# Patient Record
Sex: MALE | Race: WHITE
[De-identification: names, ages, dates, MRNs, and addresses within clinical notes are randomized per-mention and may not be internally consistent; named-entity substitution may affect disease eponyms.]

---

## 2017-07-27 ENCOUNTER — HOSPITAL ENCOUNTER (OUTPATIENT)
Dept: HOSPITAL 84 - D.CT | Age: 72
Discharge: HOME | End: 2017-07-27
Attending: FAMILY MEDICINE
Payer: MEDICARE

## 2017-07-27 DIAGNOSIS — J32.9: Primary | ICD-10-CM

## 2017-09-08 ENCOUNTER — HOSPITAL ENCOUNTER (OUTPATIENT)
Dept: HOSPITAL 84 - D.OPS | Age: 72
Discharge: HOME | End: 2017-09-08
Attending: OTOLARYNGOLOGY
Payer: MEDICARE

## 2017-09-08 VITALS — DIASTOLIC BLOOD PRESSURE: 75 MMHG | SYSTOLIC BLOOD PRESSURE: 135 MMHG

## 2017-09-08 VITALS
BODY MASS INDEX: 30.87 KG/M2 | HEIGHT: 74 IN | WEIGHT: 240.5 LBS | WEIGHT: 240.5 LBS | HEIGHT: 74 IN | BODY MASS INDEX: 30.87 KG/M2

## 2017-09-08 DIAGNOSIS — Z01.812: ICD-10-CM

## 2017-09-08 DIAGNOSIS — J32.0: Primary | ICD-10-CM

## 2017-09-08 DIAGNOSIS — J32.8: ICD-10-CM

## 2017-09-08 LAB
ERYTHROCYTE [DISTWIDTH] IN BLOOD BY AUTOMATED COUNT: 13.5 % (ref 11.5–14.5)
HCT VFR BLD CALC: 42.1 % (ref 42–54)
HGB BLD-MCNC: 14.6 G/DL (ref 13.5–17.5)
MCH RBC QN AUTO: 34 PG (ref 26–34)
MCHC RBC AUTO-ENTMCNC: 34.7 G/DL (ref 31–37)
MCV RBC: 97.9 FL (ref 80–100)
PLATELET # BLD: 157 10X3/UL (ref 130–400)
PMV BLD AUTO: 10.4 FL (ref 7.4–10.4)
RBC # BLD AUTO: 4.3 10X6/UL (ref 4.2–6.1)
WBC # BLD AUTO: 5.7 10X3/UL (ref 4.8–10.8)

## 2017-09-12 LAB — BACTERIA ISLT: (no result)

## 2019-02-27 ENCOUNTER — HOSPITAL ENCOUNTER (OUTPATIENT)
Dept: HOSPITAL 84 - D.CATH | Age: 74
End: 2019-02-27
Attending: INTERNAL MEDICINE
Payer: MEDICARE

## 2019-02-27 VITALS — BODY MASS INDEX: 30.22 KG/M2 | WEIGHT: 235.49 LBS | HEIGHT: 74 IN

## 2019-02-27 VITALS — SYSTOLIC BLOOD PRESSURE: 154 MMHG | DIASTOLIC BLOOD PRESSURE: 77 MMHG

## 2019-02-27 DIAGNOSIS — I35.1: ICD-10-CM

## 2019-02-27 DIAGNOSIS — Z01.812: ICD-10-CM

## 2019-02-27 DIAGNOSIS — I25.119: Primary | ICD-10-CM

## 2019-02-27 LAB
ANION GAP SERPL CALC-SCNC: 16.1 MMOL/L (ref 8–16)
BASOPHILS NFR BLD AUTO: 0.7 % (ref 0–2)
BUN SERPL-MCNC: 11 MG/DL (ref 7–18)
CALCIUM SERPL-MCNC: 8.6 MG/DL (ref 8.5–10.1)
CHLORIDE SERPL-SCNC: 107 MMOL/L (ref 98–107)
CO2 SERPL-SCNC: 23.9 MMOL/L (ref 21–32)
CREAT SERPL-MCNC: 0.8 MG/DL (ref 0.6–1.3)
EOSINOPHIL NFR BLD: 4.4 % (ref 0–7)
ERYTHROCYTE [DISTWIDTH] IN BLOOD BY AUTOMATED COUNT: 13.6 % (ref 11.5–14.5)
GLUCOSE SERPL-MCNC: 99 MG/DL (ref 74–106)
HCT VFR BLD CALC: 37.4 % (ref 42–54)
HGB BLD-MCNC: 12.8 G/DL (ref 13.5–17.5)
IMM GRANULOCYTES NFR BLD: 0.2 % (ref 0–5)
LYMPHOCYTES NFR BLD AUTO: 32.1 % (ref 15–50)
MCH RBC QN AUTO: 33.6 PG (ref 26–34)
MCHC RBC AUTO-ENTMCNC: 34.2 G/DL (ref 31–37)
MCV RBC: 98.2 FL (ref 80–100)
MONOCYTES NFR BLD: 13.5 % (ref 2–11)
NEUTROPHILS NFR BLD AUTO: 49.1 % (ref 40–80)
OSMOLALITY SERPL CALC.SUM OF ELEC: 283 MOSM/KG (ref 275–300)
PLATELET # BLD: 141 10X3/UL (ref 130–400)
PMV BLD AUTO: 10.1 FL (ref 7.4–10.4)
POTASSIUM SERPL-SCNC: 4 MMOL/L (ref 3.5–5.1)
RBC # BLD AUTO: 3.81 10X6/UL (ref 4.2–6.1)
SODIUM SERPL-SCNC: 143 MMOL/L (ref 136–145)
WBC # BLD AUTO: 5.5 10X3/UL (ref 4.8–10.8)

## 2019-02-27 NOTE — NUR
PATIENT AWAKE, HEAD OF BED ELEVATED TO 30 DEGREES. RIGHT GROIN
DRESSING IS CDI, NO S/S OF BLEEDING OR HEMATOMA. NO C/O PAIN,
NUMBNESS, OR TINGLING. PATIENT TOLERATING PO FLUIDS. VSS ON ROOM AIR.

## 2019-02-27 NOTE — HEMODYNAMI
PATIENT:CANDACE DESOUZA MITZY                                  MEDICAL RECORD: [id number]
: 45                                            LOCATION:D.CAT          
ACCT# [id number]                                       ADMISSION DATE: 19
 
 
 Generatedon:201914:47
Patient name: CANDACE DESOUZA Patient #: [id number] Visit #: [id number] SSN: : 
1945
Date of study: 2019
Page: Of
Hemodynamic Procedure Report
****************************
Patient Data
Patient Demographics
Procedure consent was obtained
First Name: CANDACE            Gender: Male
Last Name: LYLY           : 1945
Middle Initial: MITZY         Age: 73 year(s)
Patient #: [id number]       Race: 
Visit #: [id number]
Accession #:
[id number]
Additional ID: [id number]
Contact details
Address: [facility] [address]   Phone: [phone]
State: [place]
City: [place]
Zip code: [address]
Past Medical History
Allergies: No known allergies
Admission
Admission Data
Admission Date: 2019   Admission Time: 11:48
Lab Results
Lab Result Date: 2019  Lab Result Time: 12:15
Biochemistry
Name         Units    Result                Min      Max
BUN          mg/dl    11       --(-*--)--   7        18
Creatinine   mg/dl    0.8      --(-*--)--   0.6      1.3
CBC
Name         Units    Result                Min      Max
Hematocrit   %        37.4     *-(----)--   42       54
Hemoglobin   g/dl     12.8     -*(----)--   13.5     17.5
Procedure
Procedure Types
Cath Procedure
Diagnostic Procedure
C
[facility] w/Coronaries
Aortic Root Angiography
Sedation Charges
Moderate Sedation up to 30 minutes
Procedure Description
Procedure Date
Procedure Date: 2019
Procedure Start Time: 14:17
Procedure End Time: 14:46
Procedure Staff
Name                            Function
 
Doroteo Rivera MD                   Performing Physician
Duglas Ayala RT                  Monitor
Tiffanie Hidalgo RT                    Scrub
Michelle Rodriguez RT               Scrub
Emeterio Cabral RN              Nurse
Procedure Data
Cath Procedure
Fluoroscopy
Diagnostic fluoroscopy      Total fluoroscopy Time: 5.3
time: 5.3 min               min
Diagnostic fluoroscopy      Total fluoroscopy dose:
dose: 1195 mGy              1195 mGy
Contrast Material
Contrast Material Type                       Amount (ml)
Isovue 300                                   151
Entry Location
Entry     Primary  Successful  Side  Size  Upsize Upsize Entry    Closure Succes
sful  Closure
Location                             (Fr)  1 (Fr) 2 (Fr) Remarks  Device        
      Remarks
Femoral                        Right 5 Fr                         Exoseal
artery
Estimated blood loss: 5 ml
Diagnostic catheters
Device Type               Used For           End Catheter
Placement
MULTIPACK JL 4.0 5Fr      Procedure
catheter
DIAGNOSTIC JL 5 5Fr       Procedure
catheter (178709G)
DIAGNOSTIC JL 6 5Fr       Procedure
catheter (538505P)
MULTIPACK 3DRC 5Fr        Procedure
catheter
MULTIPACK Pigtail 5 Fr    Procedure
catheter
Procedure Complications
No complications
Procedure Medications
Medication           Administration Route Dosage
0.9% NaCl            I.V.                 100 ml/hr
Oxygen               etCO2 Nasal cannula  2 l/min
Heparin Flush Bag    added to field       2 bags
(1000units/500ml NS)
Lidocaine 2%         added to field       20
Versed               I.V.                 1 mg
Fentanyl             I.V.                 50 mcg
Versed               I.V.                 1 mg
Fentanyl             I.V.                 50 mcg
Versed               I.V.                 2 mg
Hemodynamics
Rest
HGB: 12.8 (g/dl) Heart Rate: 61 (bpm)
Pressure Samples
Time     Site     Value (mmHg) Purpose      Heart      Use
Rate(bpm)
14:33    LV       133/0,20     Snapshot     62
14:34    AO       129/64(91)   Pullback     64
14:34    LV       133/0,17     Pullback     64
Gradients
 
Valve  Time  Site 1   Site 2     Mean    SEP/DFP    Peak To Heart  Use
(mmHg)  (sec/min)  Peak    Rate
(mmHg)  (bpm)
Aortic 14:34 LV       AO         7       21         4       64
133/0,17 129/64(91)
Calculations
Valve    P-P      Mean      Valve     Index  Valve    Source
Name              Gradient  Area             Flow
(cm2)
Aortic   4        7
4        7
Snapshots
Pre Cath      Intra         NCS           Post Cath
Vital Signs
Time     Heart  Resp   SPO2 etCO2   NIBP (mmHg) Rhythm  Pain    Sedation
Rate   (ipm)  (%)  (mmHg)                      Status  Level
(bpm)
14:01:24 64     19     95   0       149/87(96)  NSR     0 (11)  10(A)
, No
pain
14:05:43 58     16     97   0       145/83(120) NSR     0 (11)  10(A)
, No
pain
14:09:57 59     25     94   24      140/85(109) NSR     0 (11)  10(A)
, No
pain
14:14:10 62     14     94   0       140/84(103) NSR     0 (11)  10(A)
, No
pain
14:18:24 59     13     98   27      144/83(117) NSR     0 (11)  10(A)
, No
pain
14:22:41 60     12     97   8.2     140/82(108) NSR     0 (11)  9(A)
, No
pain
14:26:56 60     14     98   3       142/80(112) NSR     0 (11)  9(A)
, No
pain
14:31:12 65     14     97   27      139/80(112) NSR     0 (11)  9(A)
, No
pain
14:35:28 61     14     97   10.5    140/77(104) NSR     0 (11)  9(A)
, No
pain
14:39:45 60     15     98   21.7    136/78(107) NSR     0 (11)  9(A)
, No
pain
14:43:56 56     15     98   27.7    137/84(99)  NSR     0 (11)  10(A)
, No
pain
Medications
Time     Medication       Route   Dose  Verified Delivered Reason     Notes  Eff
ectiveness
by       by
14:07:26 0.9% NaCl        I.V.    100   Emeterio  Emeterio   Per
ml/hr Misha Cabral   physician
RN       RN
14:07:36 Oxygen           etCO2   2     Emeterio  Emeterio   for low 02
Nasal   l/min Lorigan  Lorigan   sats
cannula       RN       RN
 
14:07:47 Heparin Flush    added   2     Emeterio  Emeterio   used for
Bag              to      bags  Lorigan  Lorigan   procedure
(1000units/500ml field         RN       RN
NS)
14:07:58 Lidocaine 2%     added   20ml  Emeterio  Emeterio   for local
to      vial  Lorigan  Lorigan   anesthetic
field         RN       RN
14:08:09 Versed           I.V.    1 mg  Emeterio  Emeterio   for
Lorigan  Lorigan   sedation
RN       RN
14:08:19 Fentanyl         I.V.    50    Emeterio  Emeterio   for
mcg   Lorigan  Lorigan   sedation
RN       RN
14:14:28 Versed           I.V.    1 mg  Emeterio  Emeterio   for
Lorigan  Lorigan   sedation
RN       RN
14:14:34 Fentanyl         I.V.    50    Emeterio  Emeterio   for
mcg   Lorigan  Lorigan   sedation
RN       RN
14:17:29 Versed           I.V.    2 mg  Emeterio  Emeterio   for
Lorigan  Lorigan   sedation
RN       RN
Procedure Log
Time     Note
13:47:24 Informed consent obtained and on chart
13:48:37 Diagnostic Cath Status : Elective
13:49:05 Duglas Ramoser RT(R) sent for patient. Start room use.
13:49:07 Time tracking: Regular hours (M-F 7:00 - 5:00)
13:49:11 Plan of Care:Hemodynamics will remain stable., Cardiac
rhythm will remain stable., Comfort level will be
maintained., Respiratory function will remain
adequate., Patient/ family verbilizes understanding of
procedure., Procedure tolerated without complication.,
Recovers from procedure without complications..
13:56:19 Patient received from Pre/Post Procedure Room to CCL 2
Alert and oriented. Tansferred to table in Supine
position.
13:56:21 Warm blankets applied, and chantelle hugger turned on for
patient comfort.
13:56:21 Correct patient and procedure confirmed by team.
13:56:24 ECG and BP/O2 sat monitors applied to patient.
14:00:12 Lab Result : BUN 11 mg/dl
14:00:12 Lab Result : Creatinine 0.8 mg/dl
14:00:13 Lab Result : Hemoglobin 12.8 g/dl
14:00:13 Lab Result : Hematocrit 37.4 %
14:00:14 Lab results completed and on chart.
14:00:18 Vital chart was started
14:00:21 Baseline sample Acquired.
14:00:24 Rhythm: sinus rhythm
14:00:25 Full Disclosure recording started
14:00:33 H&P Date Dictated: 2019 Within 30 days and on
chart., H&P Addendum completed by physician on day of
procedure. (MUST COMPLETE FOR ALL OUTPATIENTS).
14:00:35 Pre-procedure instructions explained to patient.
14:00:35 Pre-op teaching completed and patient verbalized
understanding.
14:00:36 Family in waiting room.
14:00:36 Patient NPO since Midnight.
14:00:40 Patient allergic to No known allergies
14:00:41 Is the patient allergic to Iodine/contrast media? No.
 
14:00:42 Is patient on blood thinner?No
14:00:43 Patient diabetic? No.
14:01:18 Previous problem with sedation/anesthesia? No ?
14:01:20 Snore? No
14:01:21 Sleep apnea? No
14:01:21 Deviated septum? No
14:01:22 Opens mouth fully? Yes
14:01:23 Sticks out tongue? Yes
14:01:25 Airway obstruction? No ?
14:01:27 Dentures? No ?
14:03:08 Pre procedure: right dorsailis pedis pulse 2+ Normal;
easily identifiable; not easily obliterated
14:03:11 Pre procedure: left dorsailis pedis pulse 2+ Normal;
easily identifiable; not easily obliterated
14:03:12 Patient pain scale 0/10 ?.
14:03:20 IV patent on arrival in left forearm with 0.9% NaCl at
[facility].
14:03:24 Right groin area was prepped with chlora-prep and
draped in sterile fashion
14:03:25 Alarms reviewed by R. N.
14:03:25 Sharps counted by scrub and verified by R.N.
14:03:27 Use device set Femoral Dx
14:03:28 ACIST Syringe (95622) opened to sterile field.
14:03:28 Bag Decanter (2002S) opened to sterile field.
14:03:29 Medline Cath Pack ([id number]) opened to sterile field.
14:03:30 ACIST Hand Control (09707) opened to sterile field.
14:03:30 ACIST Manifold (70816) opened to sterile field.
14:03:31 Tegaderm 4 x 4 (1626W) opened to sterile field.
14:03:35 SHEATH 5FR [place] (GDF065) opened to sterile field.
14:03:36 DIAGNOSTIC WIRE .035 260cm J wire (069186) opened to
sterile field.
14:03:36 DIAGNOSTIC Multipack 5Fr catheter set ([id number]) opened
to sterile field.
14:03:48 Baseline sample Acquired.
14:03:51 Physician arrived
14:03:51 --------ALL STOP TIME OUT------
14:03:51 Final Timeout: patient, procedure, and site verified
with staff and physician. All members of the team are
in agreement.
14:03:53 Right groin site verified by team.
14:03:57 Fire Safety Assessment: A--An alcohol-based skin
anteseptic being used preoperatively., C--Open oxygen
or nitrous oxide is being used., D--An ESU, laser, or
fiber-optic light is being used.
14:04:00 Physical assessment completed. ASA score P 2 - A
patient with mild systemic disease as per Doroteo Rivera
MD.
14:04:02 Sedation plan: IV Moderate Sedation Medication:Versed,
Fentanyl
14:07:26 0.9% NaCl 100 ml/hr I.V. was administered by Emeterio Cabral RN; Per physician;
14:07:36 Oxygen 2 l/min etCO2 Nasal cannula was administered by
Emeterio Cabral RN; for low 02 sats;
14:07:47 Heparin Flush Bag (1000units/500ml NS) 2 bags added to
field was administered by Emeterio Cabral RN; used for
procedure;
14:07:58 Lidocaine 2% 20ml vial added to field was administered
by Emeterio Cabral RN; for local anesthetic;
14:08:09 Versed 1 mg I.V. was administered by Emeterio Cabral
RN; for sedation;
 
14:08:19 Fentanyl 50 mcg I.V. was administered by Emeterio Cabral RN; for sedation;
14:14:28 Versed 1 mg I.V. was administered by Emeterio Cabral
RN; for sedation;
14:14:34 Fentanyl 50 mcg I.V. was administered by Emeterio Cabral RN; for sedation;
14:17:27 Procedure started.
14:17:29 Versed 2 mg I.V. was administered by Emeterio Cabral
RN; for sedation;
14:17:30 Local anesthetic to right femoral artery with
Lidocaine 2% by Doroteo Rivera MD.**INITIAL ACCESS ONLY**
14:17:49 A 5 Fr sheath was inserted into the Right Femoral
artery
14:17:53 Zero performed for pressure channel P1
14:20:31 A MULTIPACK JL 4.0 5Fr catheter was advanced over the
wire and used for Procedure.
14:20:34 Catheter exchanged over wire.
14:21:04 A DIAGNOSTIC JL 5 5Fr catheter ([id number]) was advanced
over the wire and used for Procedure.
14:22:40 Catheter exchanged over wire.
14:22:50 A DIAGNOSTIC JL 6 5Fr catheter ([id number]) was advanced
over the wire and used for Procedure.
14:24:51 LCA angiography performed.
14:26:52 Catheter exchanged over wire.
14:27:02 A MULTIPACK 3DRC 5Fr catheter was advanced over the
wire and used for Procedure.
14:29:51 RCA angiography performed.
14:30:41 Catheter exchanged over wire.
14:30:56 A MULTIPACK Pigtail 5 Fr catheter was advanced over
the wire and used for Procedure.
14:33:53 LV gram done using TELLO
14:33:56 Injector settings: Ml/sec: 10, Volume: 20,
14:33:57 LV hemodynamics recorded.
14:34:20 EF : 45 %
14:34:41 Aortic Root visualized
14:39:06 Catheter removed.
14:39:45 EXOSEAL 5Fr () opened to sterile field.
14:39:52 Sheath removed intact; hemostasis achieved with
Exoseal to the Right Femoral artery.
14:39:54 Procedure ended.(Physican Out)
14:41:07 Fluoroscopy time 05.30 minutes.
14:41:10 Fluoroscopy dose: 1195 mGy
14:41:10 Flurop Dose total: 1195
14:41:30 Contrast amount:Isovue 300 151ml.
14:41:32 Sharps counted by scrub and verified by R.N.
14:41:33 Insertion/operative site no bleeding no hematoma.
14:41:38 Post-op/insertion site Right Femoral artery dressed
using a 4 x 4 and Tegaderm.
14:41:41 Post right femoral artery:stable, soft, clean and dry
14:41:42 Post Procedure Pulses reassessed and unchanged
14:43:49 Post-procedure physical assessment completed. ASA
score P 2 - A patient with mild systemic disease as
per Doroteo Rivera MD.
14:43:52 Post procedure rhythm: unchanged.
14:43:55 Estimated blood loss: 5 ml
14:43:57 Post procedure instruction explained to
patient.Patient verbalizes understanding.
14:44:12 Patient needs reinforcement of post procedure
teaching.
14:44:36 Procedure type changed to Cath procedure, Diagnostic
 
procedure, LHC, LHC w/Coronaries, Aortic Root
Angiography, Sedation Charges, Moderate Sedation up to
30 minutes
14:46:12 Procedure and supply charges have been captured,
reviewed, submitted and are correct.
14:46:14 Procedure Complication : No complications
14:46:15 Vital chart was stopped
14:46:15 See physician's report for complete and final results.
14:46:17 Report given to Pre/Post Procedure Room.
14:46:19 Patient transfered to Pre/Post Procedure Room with
Stretcher.
14:46:20 Procedure ended.
14:46:20 Full Disclosure recording stopped
14:46:25 End room use (Document Last)
Device Usage
Item Name   Manufacture  Quantity  Catalog    Hospital Part    Current Minimal L
ot# /
Number     Charge   Number  Stock   Stock   Serial#
Code
ACSUNG       Acist        1         [id number]      [id number]   328311  549640  20
Syringe     Medical
(42854)     Systems Inc
Bag         Microtek     1         S      910474   71247   371744  5
Decanter    Medical Inc.
()
Medline     Medline      1         [id number]  075138   53009   489019  5
Cath Pack
([id number])
ACIST Hand  Acist        1         61785      308296   236141  063502  5
Control     Medical
(31003)     Systems Inc
ACIST       Acist        1         87027      609184   625775  404236  5
Manifold    Medical
(49044)     Systems Inc
Tegaderm 4  3M           1         1626W      903717   599764  238516  5
x 4 (1626W)
SHEATH 5FR  Terumo       1         [id number]     487371   009667  292015  5
[place]
(GFN705)
DIAGNOSTIC  [place]      1         574967     475246   548488  932788  30
WIRE .035
260cm J
wire
(741141)
DIAGNOSTIC  Cardinal     1         VY5400     407868   98901   108769  30
Multipack   Health
5Fr
catheter
set
(VD3949)
MULTIPACK   Cardinal     1                                     376760  5
JL 4.0 5Fr  Health
catheter
DIAGNOSTIC  Cardinal     1         416135F    166086   982222  053026  5
JL 5 5Fr    Health
catheter
(182199N)
DIAGNOSTIC  Cardinal     1         118748T    430082   156025  816320  5
JL 6 5Fr    Health
catheter
 
(254396O)
MULTIPACK   Cardinal     1                                     168452  5
3DRC 5Fr    Health
catheter
MULTIPACK   Cardinal     1                                     985080  5
Pigtail 5   Health
Fr catheter
EXOSEAL 5Fr Cardinal     1               199454   390213  672925  10
()     Health
Signature Audit [place]
Stage           Time        Signature      Unsigned
Intra-Procedure 2019   Duglas Ayala
2:47:14 PM  RT(R)
Signatures
Monitor : Duglas Ayala RT Signature :
______________________________
Date : ______________ Time :
______________
 
 
 
 
 
 
 
 
 
 
 
 
 
 
 
 
 
 
 
 
 
 
 
 
 
 
 
 
 
 
 
 
 
 
 
 
 
[address] [address]

## 2019-02-27 NOTE — NUR
PATIENT RESTING, WAKES TO VERBAL STIMULI. VSS ON 2L NC. RIGHT GROIN
DRESSING IS CDI, NO S/S OF BLEEDING OR HEMATOMA. NO C/O PAIN,
NUMBNESS, OR TINGLING.

## 2019-02-27 NOTE — NUR
PATIENT VOIDED WITH NO DIFFICULTY. PATIENT TRANSPORTED VIA WHEELCHAIR
TO CAR WITH FAMILY DRIVING, ALL BELONGINGS SENT WITH PATIENT.

## 2019-02-27 NOTE — NUR
EDUCATION REGARDING DISCHARGE INSTRUCTIONS AND FOLLOW UP GIVEN TO
PATIENT AND SON, ALL QUESTIONS ANSWERED, PATIENT VOICES
UNDERSTANDING. VSS ON ROOM AIR. HEAD OF BED AT 90 DEGREES, RIGHT
GROIN DRESSING IS CDI, NO S/S OF BLEEDING OR HEMATOMA. IV REMOVED.

## 2019-03-04 ENCOUNTER — HOSPITAL ENCOUNTER (OUTPATIENT)
Dept: HOSPITAL 84 - D.CT | Age: 74
Discharge: HOME | End: 2019-03-04
Attending: THORACIC SURGERY (CARDIOTHORACIC VASCULAR SURGERY)
Payer: MEDICARE

## 2019-03-04 VITALS — BODY MASS INDEX: 30.2 KG/M2

## 2019-03-04 DIAGNOSIS — I71.4: Primary | ICD-10-CM

## 2019-03-05 ENCOUNTER — HOSPITAL ENCOUNTER (INPATIENT)
Dept: HOSPITAL 84 - D.SDCHOLD | Age: 74
LOS: 9 days | Discharge: HOME | DRG: 236 | End: 2019-03-14
Attending: THORACIC SURGERY (CARDIOTHORACIC VASCULAR SURGERY) | Admitting: THORACIC SURGERY (CARDIOTHORACIC VASCULAR SURGERY)
Payer: MEDICARE

## 2019-03-05 VITALS
BODY MASS INDEX: 30.01 KG/M2 | WEIGHT: 233.87 LBS | BODY MASS INDEX: 30.01 KG/M2 | BODY MASS INDEX: 30.01 KG/M2 | BODY MASS INDEX: 30.01 KG/M2 | WEIGHT: 233.87 LBS | HEIGHT: 74 IN | HEIGHT: 74 IN

## 2019-03-05 DIAGNOSIS — M06.9: ICD-10-CM

## 2019-03-05 DIAGNOSIS — R41.0: ICD-10-CM

## 2019-03-05 DIAGNOSIS — I71.2: ICD-10-CM

## 2019-03-05 DIAGNOSIS — I08.0: ICD-10-CM

## 2019-03-05 DIAGNOSIS — K59.00: ICD-10-CM

## 2019-03-05 DIAGNOSIS — I48.0: ICD-10-CM

## 2019-03-05 DIAGNOSIS — R07.9: ICD-10-CM

## 2019-03-05 DIAGNOSIS — I48.91: ICD-10-CM

## 2019-03-05 DIAGNOSIS — I25.119: Primary | ICD-10-CM

## 2019-03-05 LAB
ALBUMIN SERPL-MCNC: 3.8 G/DL (ref 3.4–5)
ALP SERPL-CCNC: 56 U/L (ref 46–116)
ALT SERPL-CCNC: 28 U/L (ref 10–68)
ANION GAP SERPL CALC-SCNC: 14.6 MMOL/L (ref 8–16)
APPEARANCE UR: CLEAR
APTT BLD: 27.2 SECONDS (ref 22.8–39.4)
BASOPHILS NFR BLD AUTO: 0.7 % (ref 0–2)
BILIRUB SERPL-MCNC: 0.61 MG/DL (ref 0.2–1.3)
BILIRUB SERPL-MCNC: NEGATIVE MG/DL
BUN SERPL-MCNC: 11 MG/DL (ref 7–18)
CALCIUM SERPL-MCNC: 8.5 MG/DL (ref 8.5–10.1)
CHLORIDE SERPL-SCNC: 105 MMOL/L (ref 98–107)
CO2 SERPL-SCNC: 25.9 MMOL/L (ref 21–32)
COLOR UR: YELLOW
CREAT SERPL-MCNC: 0.8 MG/DL (ref 0.6–1.3)
EOSINOPHIL NFR BLD: 6.9 % (ref 0–7)
ERYTHROCYTE [DISTWIDTH] IN BLOOD BY AUTOMATED COUNT: 13.8 % (ref 11.5–14.5)
EST. AVERAGE GLUCOSE BLD GHB EST-MCNC: 117 MG/DL (ref 74–154)
GLOBULIN SER-MCNC: 3.1 G/L
GLUCOSE SERPL-MCNC: 95 MG/DL (ref 74–106)
GLUCOSE SERPL-MCNC: NEGATIVE MG/DL
HCT VFR BLD CALC: 38.2 % (ref 42–54)
HGB BLD-MCNC: 13 G/DL (ref 13.5–17.5)
IMM GRANULOCYTES NFR BLD: 0.2 % (ref 0–5)
INR PPP: 1.03 (ref 0.85–1.17)
KETONES UR STRIP-MCNC: NEGATIVE MG/DL
LYMPHOCYTES NFR BLD AUTO: 33.2 % (ref 15–50)
MCH RBC QN AUTO: 33.8 PG (ref 26–34)
MCHC RBC AUTO-ENTMCNC: 34 G/DL (ref 31–37)
MCV RBC: 99.2 FL (ref 80–100)
MONOCYTES NFR BLD: 12 % (ref 2–11)
NEUTROPHILS NFR BLD AUTO: 47 % (ref 40–80)
NITRITE UR-MCNC: NEGATIVE MG/ML
OSMOLALITY SERPL CALC.SUM OF ELEC: 279 MOSM/KG (ref 275–300)
PH UR STRIP: 6 [PH] (ref 5–6)
PHOSPHATE SERPL-MCNC: 3.1 MG/DL (ref 2.5–4.9)
PLATELET # BLD: 164 10X3/UL (ref 130–400)
PMV BLD AUTO: 10.2 FL (ref 7.4–10.4)
POTASSIUM SERPL-SCNC: 4.5 MMOL/L (ref 3.5–5.1)
PROT SERPL-MCNC: 6.9 G/DL (ref 6.4–8.2)
PROT UR-MCNC: NEGATIVE MG/DL
PROTHROMBIN TIME: 13 SECONDS (ref 11.6–15)
RBC # BLD AUTO: 3.85 10X6/UL (ref 4.2–6.1)
SODIUM SERPL-SCNC: 141 MMOL/L (ref 136–145)
SP GR UR STRIP: 1.01 (ref 1–1.02)
T4 FREE SERPL-MCNC: 0.96 NG/DL (ref 0.76–1.46)
TSH SERPL-ACNC: 2.38 UIU/ML (ref 0.36–3.74)
URATE SERPL-MCNC: 4.2 MG/DL (ref 2.6–7.2)
UROBILINOGEN UR-MCNC: NORMAL MG/DL
WBC # BLD AUTO: 5.7 10X3/UL (ref 4.8–10.8)

## 2019-03-07 VITALS — SYSTOLIC BLOOD PRESSURE: 96 MMHG | DIASTOLIC BLOOD PRESSURE: 50 MMHG

## 2019-03-07 VITALS — DIASTOLIC BLOOD PRESSURE: 44 MMHG | SYSTOLIC BLOOD PRESSURE: 90 MMHG

## 2019-03-07 VITALS — SYSTOLIC BLOOD PRESSURE: 127 MMHG | DIASTOLIC BLOOD PRESSURE: 59 MMHG

## 2019-03-07 VITALS — DIASTOLIC BLOOD PRESSURE: 63 MMHG | SYSTOLIC BLOOD PRESSURE: 144 MMHG

## 2019-03-07 VITALS — SYSTOLIC BLOOD PRESSURE: 135 MMHG | DIASTOLIC BLOOD PRESSURE: 64 MMHG

## 2019-03-07 VITALS — DIASTOLIC BLOOD PRESSURE: 48 MMHG | SYSTOLIC BLOOD PRESSURE: 116 MMHG

## 2019-03-07 VITALS — SYSTOLIC BLOOD PRESSURE: 140 MMHG | DIASTOLIC BLOOD PRESSURE: 68 MMHG

## 2019-03-07 VITALS — DIASTOLIC BLOOD PRESSURE: 51 MMHG | SYSTOLIC BLOOD PRESSURE: 113 MMHG

## 2019-03-07 VITALS — DIASTOLIC BLOOD PRESSURE: 63 MMHG | SYSTOLIC BLOOD PRESSURE: 136 MMHG

## 2019-03-07 VITALS — SYSTOLIC BLOOD PRESSURE: 128 MMHG | DIASTOLIC BLOOD PRESSURE: 60 MMHG

## 2019-03-07 VITALS — DIASTOLIC BLOOD PRESSURE: 49 MMHG | SYSTOLIC BLOOD PRESSURE: 101 MMHG

## 2019-03-07 VITALS — DIASTOLIC BLOOD PRESSURE: 50 MMHG | SYSTOLIC BLOOD PRESSURE: 101 MMHG

## 2019-03-07 VITALS — DIASTOLIC BLOOD PRESSURE: 61 MMHG | SYSTOLIC BLOOD PRESSURE: 135 MMHG

## 2019-03-07 VITALS — SYSTOLIC BLOOD PRESSURE: 114 MMHG | DIASTOLIC BLOOD PRESSURE: 55 MMHG

## 2019-03-07 VITALS — DIASTOLIC BLOOD PRESSURE: 54 MMHG | SYSTOLIC BLOOD PRESSURE: 116 MMHG

## 2019-03-07 VITALS — SYSTOLIC BLOOD PRESSURE: 127 MMHG | DIASTOLIC BLOOD PRESSURE: 60 MMHG

## 2019-03-07 VITALS — SYSTOLIC BLOOD PRESSURE: 134 MMHG | DIASTOLIC BLOOD PRESSURE: 59 MMHG

## 2019-03-07 VITALS — DIASTOLIC BLOOD PRESSURE: 53 MMHG | SYSTOLIC BLOOD PRESSURE: 113 MMHG

## 2019-03-07 VITALS — DIASTOLIC BLOOD PRESSURE: 58 MMHG | SYSTOLIC BLOOD PRESSURE: 137 MMHG

## 2019-03-07 VITALS — DIASTOLIC BLOOD PRESSURE: 67 MMHG | SYSTOLIC BLOOD PRESSURE: 125 MMHG

## 2019-03-07 VITALS — SYSTOLIC BLOOD PRESSURE: 123 MMHG | DIASTOLIC BLOOD PRESSURE: 56 MMHG

## 2019-03-07 VITALS — SYSTOLIC BLOOD PRESSURE: 128 MMHG | DIASTOLIC BLOOD PRESSURE: 55 MMHG

## 2019-03-07 VITALS — SYSTOLIC BLOOD PRESSURE: 117 MMHG | DIASTOLIC BLOOD PRESSURE: 56 MMHG

## 2019-03-07 VITALS — SYSTOLIC BLOOD PRESSURE: 123 MMHG | DIASTOLIC BLOOD PRESSURE: 60 MMHG

## 2019-03-07 VITALS — SYSTOLIC BLOOD PRESSURE: 124 MMHG | DIASTOLIC BLOOD PRESSURE: 60 MMHG

## 2019-03-07 VITALS — DIASTOLIC BLOOD PRESSURE: 54 MMHG | SYSTOLIC BLOOD PRESSURE: 125 MMHG

## 2019-03-07 VITALS — DIASTOLIC BLOOD PRESSURE: 52 MMHG | SYSTOLIC BLOOD PRESSURE: 118 MMHG

## 2019-03-07 VITALS — DIASTOLIC BLOOD PRESSURE: 72 MMHG | SYSTOLIC BLOOD PRESSURE: 132 MMHG

## 2019-03-07 VITALS — SYSTOLIC BLOOD PRESSURE: 138 MMHG | DIASTOLIC BLOOD PRESSURE: 62 MMHG

## 2019-03-07 VITALS — SYSTOLIC BLOOD PRESSURE: 126 MMHG | DIASTOLIC BLOOD PRESSURE: 59 MMHG

## 2019-03-07 PROCEDURE — 021109W BYPASS CORONARY ARTERY, TWO ARTERIES FROM AORTA WITH AUTOLOGOUS VENOUS TISSUE, OPEN APPROACH: ICD-10-PCS | Performed by: THORACIC SURGERY (CARDIOTHORACIC VASCULAR SURGERY)

## 2019-03-07 PROCEDURE — 02100Z9 BYPASS CORONARY ARTERY, ONE ARTERY FROM LEFT INTERNAL MAMMARY, OPEN APPROACH: ICD-10-PCS | Performed by: THORACIC SURGERY (CARDIOTHORACIC VASCULAR SURGERY)

## 2019-03-07 PROCEDURE — 06BP4ZZ EXCISION OF RIGHT SAPHENOUS VEIN, PERCUTANEOUS ENDOSCOPIC APPROACH: ICD-10-PCS | Performed by: THORACIC SURGERY (CARDIOTHORACIC VASCULAR SURGERY)

## 2019-03-07 PROCEDURE — 5A1221Z PERFORMANCE OF CARDIAC OUTPUT, CONTINUOUS: ICD-10-PCS | Performed by: THORACIC SURGERY (CARDIOTHORACIC VASCULAR SURGERY)

## 2019-03-07 PROCEDURE — B24BZZ4 ULTRASONOGRAPHY OF HEART WITH AORTA, TRANSESOPHAGEAL: ICD-10-PCS | Performed by: THORACIC SURGERY (CARDIOTHORACIC VASCULAR SURGERY)

## 2019-03-07 NOTE — NUR
REPORT RECEIVED, SHIFT ASSESSMENT COMPLETED PER FLOW SHEET. AAOX4. PPP. X2
SUBSTERNAL CT TO 20 CM SUCTION, NO AIR LEAK. X1 SUBSTERNAL CT TO CATHI DRAIN,
COMPRESSED, BLOODY OUTPUT NOTED. FLORENTINO CATHETER TO GRAVITY SECURED. SUBSTERNAL
TPM WIRES SECURED, CONNECTED TO TPM, NOT TURNED ON. RT ARTERIAL LINE AND CVP
LINE LEVELED AND ZEROED WITH GOOD WAVEFORM. WATER PROVIDED, NO DYSPHAGIA. SEE
FLOW SHEET FOR COMPLETE ASSESSMENT. WILL CONTINUE TO MONITOR. CALL LIGHT
WITHIN REACH.

## 2019-03-07 NOTE — NUR
SCHEDULED MEDS GIVEN, WATER PROVIDED, NO DYSPHAGIA. DENIES NEEDS. COUGH WEAK,
NON-PRODUCTIVE. PULLING 500-750 ON IS. WILL CONTINUE TO MONITOR.

## 2019-03-07 NOTE — NUR
PT ARRIVED TO UNIT FROM O.R. INTUBATED. 8.5 ETT, 25 @LIP. ON PLASMALYTE AND
CAT. MIDSTERNAL DRESSING C,D,I. SUBSTERNAL DRESSING OVER CHEST TUBE X2 AND
CATHI DRAIN. TPM WIRES X2. UNIT TURNED OFF. RIGHT LEG HARVEST SITES COVERED
WITH GAUZE AND KERLEX DRESSING. PALPABLE PULSES. HELEN AND SCD TO LEFT LEG.
CRITICORE FLORENTINO IN USE. RIGHT RADIAL ART LINE. RIGHT IJ CENTRAL LINE. CORDIS
IS CAPPED. CVP MONITORING.

## 2019-03-07 NOTE — NUR
PT STARTING TO WAKE. OPENS EYES. FOLLOWS COMMANDS. NODS HEAD UP AND DOWN TO
ACKNOWLEDGE UNDERSTANDS WHAT I AM TELLING HIM. MOVES ALL LIMBS. NO DISTRESS.

## 2019-03-07 NOTE — NUR
PT GIVEN PERCOCET FOR PAIN. CANNOT GET COMFORTABLE. HAVE CHANGED POSITION
MULTIPLE TIMES. FEELS "CRAMPING" IN HIS UPPER ABDOMEN/LOWER CHEST.
TOLERATING ICE CHIPS AND WATER WITH NO DIFFICULTY. NO C/O NAUSEA.

## 2019-03-07 NOTE — NUR
MORPHINE HAS BEEN GIVEN FOR PAIN. PT GIVEN "HEART" PILLOW. EXPLAINED SPLINTING
TO PT AND SON AND ENCOURAGED PT TO COUGH.

## 2019-03-07 NOTE — NUR
SON AND BROTHER AT BEDSIDE. UPDATED TO PT PROGRESS. PLAN TO EXTUBATE SOON.
PT OPENS EYES TO SPEECH. FOLLOWS COMMANDS. RECOGNIZES FAMILY.

## 2019-03-07 NOTE — NUR
FLAQUITA PERRY AT BEDSIDE. UPDATE GIVEN. QUESTIONS ANSWERED. PATIENT CALM. WILL
CONTINUE TO MONITOR.

## 2019-03-07 NOTE — NUR
REASSESSMENT COMPLETED PER FLOW SHEET, SEE FOR DETAILS. NO ACUTE DISTRESS
NOTED. WATER WITH ICE PROVIDED PER PATIENT'S REQUEST. TOLERATING CLEAR LIQUIDS
WELL. DENIES OTHER NEEDS. WILL CONTINUE TO MONITOR. CALL LIGHT WITHIN REACH.

## 2019-03-08 VITALS — SYSTOLIC BLOOD PRESSURE: 108 MMHG | DIASTOLIC BLOOD PRESSURE: 66 MMHG

## 2019-03-08 VITALS — SYSTOLIC BLOOD PRESSURE: 131 MMHG | DIASTOLIC BLOOD PRESSURE: 78 MMHG

## 2019-03-08 VITALS — DIASTOLIC BLOOD PRESSURE: 57 MMHG | SYSTOLIC BLOOD PRESSURE: 111 MMHG

## 2019-03-08 VITALS — DIASTOLIC BLOOD PRESSURE: 48 MMHG | SYSTOLIC BLOOD PRESSURE: 110 MMHG

## 2019-03-08 VITALS — SYSTOLIC BLOOD PRESSURE: 115 MMHG | DIASTOLIC BLOOD PRESSURE: 72 MMHG

## 2019-03-08 VITALS — SYSTOLIC BLOOD PRESSURE: 112 MMHG | DIASTOLIC BLOOD PRESSURE: 50 MMHG

## 2019-03-08 VITALS — DIASTOLIC BLOOD PRESSURE: 79 MMHG | SYSTOLIC BLOOD PRESSURE: 117 MMHG

## 2019-03-08 VITALS — SYSTOLIC BLOOD PRESSURE: 114 MMHG | DIASTOLIC BLOOD PRESSURE: 80 MMHG

## 2019-03-08 VITALS — DIASTOLIC BLOOD PRESSURE: 53 MMHG | SYSTOLIC BLOOD PRESSURE: 94 MMHG

## 2019-03-08 VITALS — SYSTOLIC BLOOD PRESSURE: 104 MMHG | DIASTOLIC BLOOD PRESSURE: 48 MMHG

## 2019-03-08 VITALS — DIASTOLIC BLOOD PRESSURE: 74 MMHG | SYSTOLIC BLOOD PRESSURE: 112 MMHG

## 2019-03-08 VITALS — DIASTOLIC BLOOD PRESSURE: 72 MMHG | SYSTOLIC BLOOD PRESSURE: 112 MMHG

## 2019-03-08 VITALS — DIASTOLIC BLOOD PRESSURE: 86 MMHG | SYSTOLIC BLOOD PRESSURE: 132 MMHG

## 2019-03-08 VITALS — SYSTOLIC BLOOD PRESSURE: 106 MMHG | DIASTOLIC BLOOD PRESSURE: 66 MMHG

## 2019-03-08 VITALS — SYSTOLIC BLOOD PRESSURE: 124 MMHG | DIASTOLIC BLOOD PRESSURE: 52 MMHG

## 2019-03-08 VITALS — DIASTOLIC BLOOD PRESSURE: 69 MMHG | SYSTOLIC BLOOD PRESSURE: 107 MMHG

## 2019-03-08 VITALS — DIASTOLIC BLOOD PRESSURE: 74 MMHG | SYSTOLIC BLOOD PRESSURE: 107 MMHG

## 2019-03-08 VITALS — SYSTOLIC BLOOD PRESSURE: 117 MMHG | DIASTOLIC BLOOD PRESSURE: 71 MMHG

## 2019-03-08 VITALS — SYSTOLIC BLOOD PRESSURE: 101 MMHG | DIASTOLIC BLOOD PRESSURE: 64 MMHG

## 2019-03-08 VITALS — SYSTOLIC BLOOD PRESSURE: 101 MMHG | DIASTOLIC BLOOD PRESSURE: 69 MMHG

## 2019-03-08 VITALS — DIASTOLIC BLOOD PRESSURE: 77 MMHG | SYSTOLIC BLOOD PRESSURE: 136 MMHG

## 2019-03-08 VITALS — DIASTOLIC BLOOD PRESSURE: 65 MMHG | SYSTOLIC BLOOD PRESSURE: 99 MMHG

## 2019-03-08 VITALS — DIASTOLIC BLOOD PRESSURE: 73 MMHG | SYSTOLIC BLOOD PRESSURE: 106 MMHG

## 2019-03-08 VITALS — SYSTOLIC BLOOD PRESSURE: 113 MMHG | DIASTOLIC BLOOD PRESSURE: 63 MMHG

## 2019-03-08 VITALS — SYSTOLIC BLOOD PRESSURE: 110 MMHG | DIASTOLIC BLOOD PRESSURE: 70 MMHG

## 2019-03-08 VITALS — DIASTOLIC BLOOD PRESSURE: 67 MMHG | SYSTOLIC BLOOD PRESSURE: 133 MMHG

## 2019-03-08 VITALS — SYSTOLIC BLOOD PRESSURE: 103 MMHG | DIASTOLIC BLOOD PRESSURE: 45 MMHG

## 2019-03-08 VITALS — DIASTOLIC BLOOD PRESSURE: 68 MMHG | SYSTOLIC BLOOD PRESSURE: 106 MMHG

## 2019-03-08 VITALS — SYSTOLIC BLOOD PRESSURE: 111 MMHG | DIASTOLIC BLOOD PRESSURE: 58 MMHG

## 2019-03-08 VITALS — SYSTOLIC BLOOD PRESSURE: 113 MMHG | DIASTOLIC BLOOD PRESSURE: 67 MMHG

## 2019-03-08 VITALS — SYSTOLIC BLOOD PRESSURE: 121 MMHG | DIASTOLIC BLOOD PRESSURE: 71 MMHG

## 2019-03-08 VITALS — SYSTOLIC BLOOD PRESSURE: 106 MMHG | DIASTOLIC BLOOD PRESSURE: 71 MMHG

## 2019-03-08 VITALS — DIASTOLIC BLOOD PRESSURE: 65 MMHG | SYSTOLIC BLOOD PRESSURE: 103 MMHG

## 2019-03-08 VITALS — DIASTOLIC BLOOD PRESSURE: 61 MMHG | SYSTOLIC BLOOD PRESSURE: 97 MMHG

## 2019-03-08 VITALS — SYSTOLIC BLOOD PRESSURE: 109 MMHG | DIASTOLIC BLOOD PRESSURE: 70 MMHG

## 2019-03-08 VITALS — DIASTOLIC BLOOD PRESSURE: 57 MMHG | SYSTOLIC BLOOD PRESSURE: 138 MMHG

## 2019-03-08 VITALS — SYSTOLIC BLOOD PRESSURE: 119 MMHG | DIASTOLIC BLOOD PRESSURE: 68 MMHG

## 2019-03-08 VITALS — DIASTOLIC BLOOD PRESSURE: 70 MMHG | SYSTOLIC BLOOD PRESSURE: 112 MMHG

## 2019-03-08 VITALS — DIASTOLIC BLOOD PRESSURE: 47 MMHG | SYSTOLIC BLOOD PRESSURE: 85 MMHG

## 2019-03-08 VITALS — DIASTOLIC BLOOD PRESSURE: 55 MMHG | SYSTOLIC BLOOD PRESSURE: 130 MMHG

## 2019-03-08 LAB
ALBUMIN SERPL-MCNC: 3.1 G/DL (ref 3.4–5)
ALP SERPL-CCNC: 29 U/L (ref 46–116)
ALT SERPL-CCNC: 31 U/L (ref 10–68)
ANION GAP SERPL CALC-SCNC: 14.4 MMOL/L (ref 8–16)
BILIRUB SERPL-MCNC: 0.92 MG/DL (ref 0.2–1.3)
BUN SERPL-MCNC: 12 MG/DL (ref 7–18)
CALCIUM SERPL-MCNC: 7.3 MG/DL (ref 8.5–10.1)
CHLORIDE SERPL-SCNC: 107 MMOL/L (ref 98–107)
CO2 SERPL-SCNC: 24.7 MMOL/L (ref 21–32)
CREAT SERPL-MCNC: 0.8 MG/DL (ref 0.6–1.3)
ERYTHROCYTE [DISTWIDTH] IN BLOOD BY AUTOMATED COUNT: 14 % (ref 11.5–14.5)
GLOBULIN SER-MCNC: 2.2 G/L
GLUCOSE SERPL-MCNC: 162 MG/DL (ref 74–106)
HCT VFR BLD CALC: 34.7 % (ref 42–54)
HGB BLD-MCNC: 11.7 G/DL (ref 13.5–17.5)
MCH RBC QN AUTO: 33.1 PG (ref 26–34)
MCHC RBC AUTO-ENTMCNC: 33.7 G/DL (ref 31–37)
MCV RBC: 98 FL (ref 80–100)
OSMOLALITY SERPL CALC.SUM OF ELEC: 286 MOSM/KG (ref 275–300)
PLATELET # BLD: 122 10X3/UL (ref 130–400)
PMV BLD AUTO: 10.2 FL (ref 7.4–10.4)
POTASSIUM SERPL-SCNC: 4.1 MMOL/L (ref 3.5–5.1)
PROT SERPL-MCNC: 5.3 G/DL (ref 6.4–8.2)
RBC # BLD AUTO: 3.54 10X6/UL (ref 4.2–6.1)
SODIUM SERPL-SCNC: 142 MMOL/L (ref 136–145)
WBC # BLD AUTO: 9.9 10X3/UL (ref 4.8–10.8)

## 2019-03-08 NOTE — NUR
BEDSIDE SHIFT REPORT GIVEN BY DEPARTING RN. PT LAYING IN BED WITH EYES CLOSED.
AAOX4. PERRLA. C/O INCISIONAL PAIN. RT IJ CVL SALINE LOCKED. F/C DRAINING TO
GRAVITY. SAFETY MEASURES IN PLACE. FRESH WATER PROVIDED. CBIR. WILL CONTINUE
TO MONITOR.

## 2019-03-08 NOTE — MORECARE
CASE MANAGEMENT DISCHARGE SUMMARY
 
 
PATIENT: CANDACE DESOUZA MITZY                     UNIT: [id number]
ACCOUNT#: [id number]                       ADM DATE: 19
AGE: 73     : [birth year]  SEX: M            ROOM/BED: [facility]    
AUTHOR: RAMONA,DOC                             PHYSICIAN:                               
 
REFERRING PHYSICIAN: MARGARETTE LAU MD            
DATE OF SERVICE: 19
Discharge Plan
 
 
Patient Name: CANDACE DESOUZA
Facility: [facility]
Encounter #: [id number]
Medical Record #: [id number]
: 1945
Planned Disposition: Home
Anticipated Discharge Date: 
 
Discharge Date: 
Expected LOS: 
Initial Reviewer: [id number]
Initial Review Date: 2019
Generated: 3/8/19   7:42 pm 
Comments
 
DCP- Discharge Planning
 
Updated by [email or web address]: Olya Montero on 3/8/19   5:36 pm CT
Patient Name: CANDACE DESOUZA                                     
Admission Status: Urgent   
Accout number: [id number]                              
Admission Date: 2019   
: 1945                                                        
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   
Attending: MARGARETTE LAU                                                
Current LOS:  1   
  
Anticipated DC Date:    
Planned Disposition: Home   
Primary Insurance: MEDICARE A & B   
  
  
Discharge Planning Comments: CM met with patient  at bedside after obtaining 
verbal consent.  Patient states he plans on returning home after discharge 
with his son.  Patient states he will have family transport him home via 
private vehicle.  Patient denies any discharge needs at this time. Patient 
may need walk test if still requiring 02. CM will continue to follow and 
 
assist as needed for discharge planning / needs.  
  
  
  
  
  
  
: Olya Montero
 DCPIA - Discharge Planning Initial Assessment
 
Updated by [id number]: Olya Montero on 3/8/19   6:33 pm
*  Is the patient Alert and Oriented?
Yes
*  How many steps to enter\exit or inside your home?
 
*  PCP
[place]
*  Pharmacy
[facility]
*  Preadmission Environment
Home with Family
*  ADLs
Independent
*  Equipment
Cane
*  Other Equipment
GRAB BARS IN BATH ROOM
*  List name and contact numbers for known caregivers / representatives who 
currently or will assist patient after discharge:
MIAH DESOUZA - BROTHER - [phone]  ORLANDO DESOUZA - SON - [phone]  MIGUEL 
DWAYNE - FRIEND- [phone]
 
*  Verbal permission to speak to the caregivers and representatives has been 
obtained from the patient.
No
*  Community resources currently utilized
None
*  Additional services required to return to the preadmission environment?
No
*  Can the patient safely return to the preadmission environment?
Yes
*  Has this patient been hospitalized within the prior 30 days at any 
hospital?
No
 
 
 
 
 
 
 
Last DP export: 3/8/19   5:33 pm
 
Patient Name: CANDACE DESOUZA
Encounter #: [id number]
Page [id number]
 
 
 
 
 
Electronically Signed by GENNY GREENE on 19 at 1842
 
 
 
 
 
 
**All edits/amendments must be made on the electronic document**
 
DICTATION DATE: 19     : SANIYA  19     
RPT#: [id number]                                DC DATE:        
                                               STATUS: ADM IN  
[facility]
[address] [place] [address]
***END OF REPORT***

## 2019-03-08 NOTE — OP
PATIENT NAME:  CANDACE DESOUZA EDD                           MEDICAL RECORD: [id number]
:45                                             LOCATION:D.CVI    D.CV03
                                                         ADMISSION DATE:19
SURGEON:  MP LAU MD            
 
 
DATE OF OPERATION:  2019
 
SURGEON:  Mp Lau MD
 
ASSISTANT:  FERNANDA Goff MD and Melvin Adame
 
OPERATION PERFORMED:
1.  Coronary artery bypass graft times 3 (left internal mammary artery to LAD,
reverse saphenous vein graft from aorta to diagonal, aorta to obtuse marginal).
2.  Endoscopic saphenous vein harvest.
 
PREOPERATIVE DIAGNOSES:  Coronary artery disease, aortic insufficiency, mitral
regurgitation, ascending aortic aneurysm.
 
POSTOPERATIVE DIAGNOSES:  Coronary artery disease, aortic insufficiency, mitral
regurgitation, ascending aortic aneurysm.
 
ANESTHESIA:  General endotracheal anesthesia.
 
ESTIMATED BLOOD LOSS:  Total cardiopulmonary bypass with Cell Saver
retransfusion.
 
COMPLICATIONS:  None.
 
SPECIMENS:  None.
 
CONDITION:  Stable.
 
DISPOSITION:  CV ICU.
 
OPERATIVE FINDINGS:
1.  Transesophageal echocardiography confirmed mild 1+ aortic insufficiency, 1+
mitral regurgitation and a slightly dilated left ventricular cavity with good
global contractility.  This was the same after separation from cardiopulmonary
bypass.
2.  Ascending aorta of 4.5 cm.  The wall thickness was normal.
3.  Good quality greater saphenous vein harvested from the right leg
endoscopically.
4.  Good quality left internal mammary artery.  The LAD was deeply
intramyocardial until near the distal point where it was a 1.5-mm vessel going
distally, but a 2.0-mm vessel proximally.  There was good Doppler signal after
anastomosis and after reversal of heparin.
5.  The diagonal was a bifurcating vessel.  The larger vessel was closer to the
LAD.  It was a 1.5-mm vessel.
6.  Obtuse marginal of 2.0.
 
OPERATIVE INDICATION:  Coronary artery disease, unstable angina.
 
DESCRIPTION OF PROCEDURE:  The patient was brought to the operating suite. 
General anesthesia was obtained, the patient was prepped and draped.  Greater
saphenous vein was harvested from the right lower extremity utilizing endoscopic
technique.  Side branches were divided with electrocautery.  The vessel was
 
 
 
OPERATIVE REPORT                               [id number]    CANDACE DESOUZA EDD         
 
 
ligated proximal and distal and removed.  Side branches were clipped.  This
portion of the procedure was performed by the assistant surgeon, Dr. Goff. 
Side branches were tied and thin sites were oversewn.  Utilization of assistant
surgeon saved approximately 30 minutes of general anesthesia time, later the leg
was closed in 2 layers and wrapped with elastic wrap.
 
Medial sternotomy incision was made.  The subcutaneous tissue was divided by
electrocautery.  Sternum was divided with a saw.  The left hemisternum was
elevated.  Left pleural cavity was entered.  Left internal mammary artery and
vein was taken as a pedicle graft.
 
Sternal retractor was placed.  Pericardium was opened.  Heparin was given.  The
aorta was cannulated.  Dual stage venous cannula was inserted.  Internal mammary
was made ready for anastomosis.  The patient was placed on cardiopulmonary
bypass.  Sites for distal aspects were selected.  The patient's temperature was
allowed to drift.  Antegrade cardioplegia cannula was inserted.  Crossclamp was
placed.  Cardioplegia was given antegrade and this was repeated at 15 to 20
minute intervals including down the completed vein grafts.
 
Distal anastomoses were performed in standard technique.  Proximal anastomosis
with single cross-clamp technique.  Aortic root was de-aired.  Proximal
anastomoses tied down.  Vein grafts de-aired, flow restored single successful
proximal anastomotic site bleeding.  Proximal and distal anastomotic sites
inspected for bleeding.  Initially, the patient had bradycardia and then resumed
to a sinus rhythm and after full rewarming.  Atrial and ventricular pacing wires
were placed.  Left chest was evacuated.  The patient was fully rewarmed with
cardiopulmonary bypass and was stable.
 
The patient was decannulated.  The cannula sites were oversewn with pledgeted
Prolene sutures.  Protamine was given.  Thorough irrigation was undertaken. 
Hemostasis was assured.  All grafts lay appropriately.  Drains were placed in
the mediastinum and left pleural cavity.  Pericardial fat was loosely
reapproximated.  Left chest was inspected for bleeding.  Sternum was closed with
wires.  Fascia was closed.  Subcutaneous tissue was closed.  Skin was closed. 
Dermabond was placed.  The needle and sponge counts reported as correct.  The
patient was taken to ICU in stable condition.
 
TRANSINT:[id number] Voice Confirmation ID: [id number] DOCUMENT ID: [id number]
                                           
                                           MP LAU MD            
 
 
 
Electronically Signed by MP LAU on 19 at 1759
 
 
 
CC: RUBY SZYMANSKI M.D. and RUBY TRAN                  [id number]
DICTATION DATE: 19     :     19      ADM IN  
                                                                              
[facility]                                          
[address] [place] [address]

## 2019-03-08 NOTE — NUR
BED BATH GIVEN, FLORENTINO CARE PROVIDED. TOLERATED WELL. PULLING 500-750 ON IS.
COUGH WEAK, NON-PRODUCTIVE. WILL CONTINUE TO MONITOR.

## 2019-03-08 NOTE — NUR
REASSESSMENT COMPLETED PER FLOW SHEET, SEE FOR DETAILS. NO ACUTE DISTRESS
NOTED. WILL CONTINUE TO MONITOR.

## 2019-03-08 NOTE — MORECARE
CASE MANAGEMENT DISCHARGE SUMMARY
 
 
PATIENT: CANDACE DESOUZA MITZY                     UNIT: [id number]
ACCOUNT#: [id number]                       ADM DATE: 19
AGE: 73     : 45  SEX: M            [facility]    
AUTHOR: GENNY GREENE                             PHYSICIAN:                               
 
REFERRING PHYSICIAN: MARGARETTE LAU MD            
DATE OF SERVICE: 19
Discharge Plan
 
 
Patient Name: CANDACE DESOUZA
[facility]
Encounter #: [id number]
Medical Record #: [id number]
: 1945
Planned Disposition: Home
Anticipated Discharge Date: 
 
Discharge Date: 
Expected LOS: 
Initial Reviewer: [id number]
Initial Review Date: 2019
Generated: 3/8/19   7:33 pm 
 DCPIA - Discharge Planning Initial Assessment
 
Updated by [email or web address]: Olya Montero on 3/8/19   6:33 pm
*  Is the patient Alert and Oriented?
Yes
*  How many steps to enter\exit or inside your home?
 
*  PCP
[place]
*  Pharmacy
[facility]
*  Preadmission Environment
Home with Family
*  ADLs
Independent
*  Equipment
Cane
*  Other Equipment
GRAB BARS IN BATH ROOM
*  List name and contact numbers for known caregivers / representatives who 
currently or will assist patient after discharge:
MIAH DESOUZA - BROTHER - [phone]  ORLANDO DESOUZA - SON - [phone]  MIGUEL 
DWAYNE - FRIEND- [phone]
 
 
*  Verbal permission to speak to the caregivers and representatives has been 
obtained from the patient.
No
*  Community resources currently utilized
None
*  Additional services required to return to the preadmission environment?
No
*  Can the patient safely return to the preadmission environment?
Yes
*  Has this patient been hospitalized within the prior 30 days at any 
hospital?
No
 
 
 
 
 
 
Patient Name: CANDACE DESOUZA
Encounter #: [id number]
Page [phone]
 
 
 
 
 
Electronically Signed by GENNY GREENE on 19 at 1833
 
 
 
 
 
 
**All edits/amendments must be made on the electronic document**
 
DICTATION DATE: 19     : SANIYA  19     
RPT#: [id number]                                DC DATE:        
                                               STATUS: ADM IN  
[facility]
[address] [place] [address]
***END OF REPORT***

## 2019-03-08 NOTE — NUR
0830:  DR. LAU HERE.  CONDITION UPDATE GIVEN.  ORDERS REC'D TO DC ART LINE.
0900:  R RADIAL ART LINE DC'D.  MANUAL PRESSURE HELD X 2 MIN.  DRESSED WITH
2X2 AND SECURED WITH TAPE.

## 2019-03-08 NOTE — NUR
SUBSTERNAL DRESSING CHANGED. X2 TPM WIRES INTACT. LT CT CATHI DRAIN EMPTIED
USING STERILE TECHNIQUE, 70 MLS BLOODY OUTPUT. TOLERATED WELL. CALL LIGHT
WITHIN REACH. WILL CONTINUE TO MONITOR.

## 2019-03-08 NOTE — NUR
CALLED AND SPOKE TO DR. LAU, UPDATE GIVEN ON PATIENT STATUS. INFORMED HIM OF
LOW BP WHEN PATIENT GOT UP TO SIT UP IN CHAIR, INFORMED HIM OF INITIATION OF
NEOSYNEPHRINE DRIP. NO NEW ORDERS RECEIVED. WILL CONTINUE TO MONITOR.

## 2019-03-09 VITALS — DIASTOLIC BLOOD PRESSURE: 65 MMHG | SYSTOLIC BLOOD PRESSURE: 108 MMHG

## 2019-03-09 VITALS — SYSTOLIC BLOOD PRESSURE: 109 MMHG | DIASTOLIC BLOOD PRESSURE: 61 MMHG

## 2019-03-09 VITALS — SYSTOLIC BLOOD PRESSURE: 118 MMHG | DIASTOLIC BLOOD PRESSURE: 69 MMHG

## 2019-03-09 VITALS — SYSTOLIC BLOOD PRESSURE: 98 MMHG | DIASTOLIC BLOOD PRESSURE: 64 MMHG

## 2019-03-09 VITALS — SYSTOLIC BLOOD PRESSURE: 97 MMHG | DIASTOLIC BLOOD PRESSURE: 61 MMHG

## 2019-03-09 VITALS — SYSTOLIC BLOOD PRESSURE: 107 MMHG | DIASTOLIC BLOOD PRESSURE: 61 MMHG

## 2019-03-09 VITALS — SYSTOLIC BLOOD PRESSURE: 90 MMHG | DIASTOLIC BLOOD PRESSURE: 59 MMHG

## 2019-03-09 VITALS — DIASTOLIC BLOOD PRESSURE: 62 MMHG | SYSTOLIC BLOOD PRESSURE: 102 MMHG

## 2019-03-09 VITALS — SYSTOLIC BLOOD PRESSURE: 104 MMHG | DIASTOLIC BLOOD PRESSURE: 59 MMHG

## 2019-03-09 VITALS — SYSTOLIC BLOOD PRESSURE: 121 MMHG | DIASTOLIC BLOOD PRESSURE: 74 MMHG

## 2019-03-09 VITALS — DIASTOLIC BLOOD PRESSURE: 64 MMHG | SYSTOLIC BLOOD PRESSURE: 110 MMHG

## 2019-03-09 VITALS — SYSTOLIC BLOOD PRESSURE: 122 MMHG | DIASTOLIC BLOOD PRESSURE: 71 MMHG

## 2019-03-09 VITALS — SYSTOLIC BLOOD PRESSURE: 104 MMHG | DIASTOLIC BLOOD PRESSURE: 64 MMHG

## 2019-03-09 VITALS — DIASTOLIC BLOOD PRESSURE: 71 MMHG | SYSTOLIC BLOOD PRESSURE: 114 MMHG

## 2019-03-09 VITALS — SYSTOLIC BLOOD PRESSURE: 121 MMHG | DIASTOLIC BLOOD PRESSURE: 59 MMHG

## 2019-03-09 VITALS — SYSTOLIC BLOOD PRESSURE: 102 MMHG | DIASTOLIC BLOOD PRESSURE: 64 MMHG

## 2019-03-09 VITALS — SYSTOLIC BLOOD PRESSURE: 113 MMHG | DIASTOLIC BLOOD PRESSURE: 66 MMHG

## 2019-03-09 VITALS — DIASTOLIC BLOOD PRESSURE: 61 MMHG | SYSTOLIC BLOOD PRESSURE: 106 MMHG

## 2019-03-09 VITALS — SYSTOLIC BLOOD PRESSURE: 113 MMHG | DIASTOLIC BLOOD PRESSURE: 67 MMHG

## 2019-03-09 VITALS — DIASTOLIC BLOOD PRESSURE: 66 MMHG | SYSTOLIC BLOOD PRESSURE: 111 MMHG

## 2019-03-09 VITALS — DIASTOLIC BLOOD PRESSURE: 69 MMHG | SYSTOLIC BLOOD PRESSURE: 120 MMHG

## 2019-03-09 VITALS — SYSTOLIC BLOOD PRESSURE: 102 MMHG | DIASTOLIC BLOOD PRESSURE: 72 MMHG

## 2019-03-09 LAB
ALBUMIN SERPL-MCNC: 2.9 G/DL (ref 3.4–5)
ALP SERPL-CCNC: 38 U/L (ref 46–116)
ALT SERPL-CCNC: 29 U/L (ref 10–68)
ANION GAP SERPL CALC-SCNC: 15.1 MMOL/L (ref 8–16)
BILIRUB SERPL-MCNC: 0.99 MG/DL (ref 0.2–1.3)
BUN SERPL-MCNC: 11 MG/DL (ref 7–18)
CALCIUM SERPL-MCNC: 7.7 MG/DL (ref 8.5–10.1)
CHLORIDE SERPL-SCNC: 104 MMOL/L (ref 98–107)
CO2 SERPL-SCNC: 25.2 MMOL/L (ref 21–32)
CREAT SERPL-MCNC: 0.9 MG/DL (ref 0.6–1.3)
ERYTHROCYTE [DISTWIDTH] IN BLOOD BY AUTOMATED COUNT: 14.1 % (ref 11.5–14.5)
GLOBULIN SER-MCNC: 3.1 G/L
GLUCOSE SERPL-MCNC: 139 MG/DL (ref 74–106)
HCT VFR BLD CALC: 34.5 % (ref 42–54)
HGB BLD-MCNC: 11.6 G/DL (ref 13.5–17.5)
MAGNESIUM SERPL-MCNC: 2.1 MG/DL (ref 1.8–2.4)
MCH RBC QN AUTO: 33.1 PG (ref 26–34)
MCHC RBC AUTO-ENTMCNC: 33.6 G/DL (ref 31–37)
MCV RBC: 98.6 FL (ref 80–100)
OSMOLALITY SERPL CALC.SUM OF ELEC: 279 MOSM/KG (ref 275–300)
PLATELET # BLD: 125 10X3/UL (ref 130–400)
PMV BLD AUTO: 10.3 FL (ref 7.4–10.4)
POTASSIUM SERPL-SCNC: 4.3 MMOL/L (ref 3.5–5.1)
POTASSIUM SERPL-SCNC: 4.3 MMOL/L (ref 3.5–5.1)
PROT SERPL-MCNC: 6 G/DL (ref 6.4–8.2)
RBC # BLD AUTO: 3.5 10X6/UL (ref 4.2–6.1)
SODIUM SERPL-SCNC: 140 MMOL/L (ref 136–145)
WBC # BLD AUTO: 12.5 10X3/UL (ref 4.8–10.8)

## 2019-03-09 NOTE — NUR
UP TO BATHROOM WITH ASSISTANCE WITHOUT DIFICULTY. URINATED. THOUGHT HE NEEDED
TO DEFICATE. NO STOOL.

## 2019-03-09 NOTE — CN
PATIENT NAME:CANDACE DESOUZA EDD                               MEDICAL RECORD: [id number]
: 45                                              LOCATION:JAIMEID.CV03
ADMIT DATE: 19                                       ACCOUNT: [id number]
CONSULTING PHYSICIAN:    RUBY TRAN MD           
                                               
REFERRING PHYSICIAN:     MP LAU MD            
 
 
DATE OF CONSULTATION:  2019
 
REASON FOR CONSULTATION:  Medical management, postoperative coronary artery
bypass grafting, and rheumatoid arthritis.
 
HISTORY OF PRESENT ILLNESS:  The patient is a 73-year-old  male who is
noted to be having increasing exertional chest tightness over the last several
weeks.  He was referred to Dr. Rivera for this reason.  An abnormal
echocardiogram showing dilated aortic root.  He underwent cardiac
catheterization on 2019, revealing proximal LAD 90% stenosis with first
diagonal 90% stenosis in the midportion.  Circumflex had a 90% stenosis.  His LV
was upper limits of normal, ejection fraction 45% to 50%.  Ascending dilated
aortic root, almost 5 cm in diameter with mild aortic insufficiency.  He
underwent coronary artery bypass graft today by Dr. Mp Lau and Thai Goff.  He is postop now and alert.
 
PAST MEDICAL HISTORY:  Rheumatoid arthritis, osteoarthritis, amblyopia, history
of Baker's cyst, left knee, depression, diverticulitis, GERD, IBS, chronic
sinusitis, obesity, BPH, and depression.
 
PAST SURGICAL HISTORY:  Recent CABG, history of nasal sinus surgery on the left
with middle meatal antrostomy, tonsillectomy, cataract extractions OU, and left
total knee replacement, recent CABG and left middle meatal antrostomy .
 
SOCIAL HISTORY:  He is single, has 1 son who lives in town.  He is semi-retired
/owner.  Smoked for 15 years, not in some time.  Drinks
occasional alcohol, drinks at night.
 
HOME MEDICATIONS:  Lexapro 10 mg a day, Norco 5/325 one q.6 hours p.r.n. pain,
Ambien 10 mg at bedtime for sleep, Orencia 125 mg per mL injected weekly,
MiraLax 17 grams p.o. daily, vitamin B12 1 cc IM every 3 weeks, Plaquenil 200 mg
p.o. b.i.d., and fenoprofen 400 mg p.o. 3 capsules daily.
 
ALLERGIES:  None known.
 
REVIEW OF SYSTEMS:
GENERAL:  He has been quite fatigued recently.  No recent fever.
HEENT:  No recent visual change, sinus congestion, sore throat or visual
disturbance.
CARDIAC:  Exertional chest pain walking up a hill.  Pain does not radiate.  No
claudication.
RESPIRATORY:  No shortness of breath or cough.
GASTROINTESTINAL:  No nausea, vomiting, change in bowel habits.
GENITOURINARY:  Nocturia once nightly.
ENDOCRINE:  Denies polyuria, polydipsia, heat or cold intolerance.
NEUROLOGIC:  No history of stroke, TIA, vascular headaches, or seizures.
PSYCHIATRIC:  Admits to depressed mood, usually is worse in the winter months.
MUSCULOSKELETAL:  Chronic arthralgias in his hands, knees, and low back.
 
 
 
 
CONSULT REPORT                                 [id number]    CANDACE DESOUZA EDD             
 
 
PHYSICAL EXAMINATION:
VITAL SIGNS:  Temperature is 98.6 Fahrenheit, pulse 84 and regular, respirations
are 24, blood pressure 116/54, O2 sat 92% on 5 liters.
GENERAL:  The patient is alert and oriented, immediately postop.
EYES:  Are clear with Lacri-Lube.
MOUTH:  Oropharynx unremarkable.
NECK:  No bruits.
CHEST:  Distant breath sounds.  No wheezes.  He has fresh surgical dressing
noted.
ABDOMEN:  Soft.
EXTREMITIES:  No gross edema.  He has some ulnar drift in both hands. 
Postoperative vein harvesting sites noted.
 
LABORATORY DATA:  White count is 5700 with H&H of 13 and 38.2 respectively. 
Blood sugar is 141.  INR is 1.  ABG:  pH 7.33, pO2 of 86, pCO2 of 38 on 5
liters.  Urinalysis is unremarkable.  Preoperative chest x-ray is unremarkable. 
MRI of the abdomen showed a left tight appearing adrenal adenoma.  Carotid
Doppler study was negative for carotid occlusive disease.
 
ASSESSMENT:
1.  Immediately postop 3-vessel coronary artery bypass grafting.
2.  Rheumatoid arthritis.
3.  Osteoarthritis.
4.  Gastroesophageal reflux disease.
 
PLAN:  Follow patient postoperatively with you.  At this time, resume
antidepressant and rheumatoid meds when cleared by CV surgeon.
 
TRANSINT:[id number] Voice Confirmation ID: [id number] DOCUMENT ID: [id number]
                                           
                                           RUBY TRAN MD           
 
 
 
Electronically Signed by RUBY TRAN on 19 at 1303
 
 
 
 
 
 
 
 
 
 
 
 
CC:                                                             [id number]
DICTATION DATE: 19     :     19      ADM IN  
                                                                              
[place] [address]

## 2019-03-09 NOTE — NUR
1900 BEDSIDE SHIFT REPORT COMPLETE.  ASSUMED CARE OF PATIENT. SEE ASSESSMENT
FLOWSHEET.
 
1926 PT SITTING AT SIDE OF BED. NOTED UNCONTROLLED AFIB PER TELEMETRY MONITOR.
VSS. ASYMPTOMATIC AT THIS TIME.
 
2100 MEDS GIVEN. SNACK AND FLUIDS OFFERED.
 
2300 SEE ASSESSMENT FLOWSHEET

## 2019-03-09 NOTE — NUR
COMPLETE BED BATH GIVEN. LINENS CHANGED. GOWN CHANGED. OOB INTO CHAIR.
TOLERATED WELL. PRN PAIN MED GIVEN. SEE MAR FOR DETAILS.

## 2019-03-09 NOTE — NUR
DR ADAMS INFORMED OF PT STATUS. RHYTHM CHANGE 1926. ORDERS RECEIVED
HEMOSTABLE. WILL CONTINUE TO MONITOR.

## 2019-03-10 VITALS — SYSTOLIC BLOOD PRESSURE: 88 MMHG | DIASTOLIC BLOOD PRESSURE: 51 MMHG

## 2019-03-10 VITALS — SYSTOLIC BLOOD PRESSURE: 92 MMHG | DIASTOLIC BLOOD PRESSURE: 54 MMHG

## 2019-03-10 VITALS — DIASTOLIC BLOOD PRESSURE: 55 MMHG | SYSTOLIC BLOOD PRESSURE: 94 MMHG

## 2019-03-10 VITALS — DIASTOLIC BLOOD PRESSURE: 64 MMHG | SYSTOLIC BLOOD PRESSURE: 98 MMHG

## 2019-03-10 VITALS — DIASTOLIC BLOOD PRESSURE: 58 MMHG | SYSTOLIC BLOOD PRESSURE: 99 MMHG

## 2019-03-10 VITALS — DIASTOLIC BLOOD PRESSURE: 53 MMHG | SYSTOLIC BLOOD PRESSURE: 92 MMHG

## 2019-03-10 VITALS — DIASTOLIC BLOOD PRESSURE: 61 MMHG | SYSTOLIC BLOOD PRESSURE: 97 MMHG

## 2019-03-10 VITALS — SYSTOLIC BLOOD PRESSURE: 97 MMHG | DIASTOLIC BLOOD PRESSURE: 60 MMHG

## 2019-03-10 VITALS — SYSTOLIC BLOOD PRESSURE: 98 MMHG | DIASTOLIC BLOOD PRESSURE: 62 MMHG

## 2019-03-10 VITALS — DIASTOLIC BLOOD PRESSURE: 58 MMHG | SYSTOLIC BLOOD PRESSURE: 88 MMHG

## 2019-03-10 VITALS — SYSTOLIC BLOOD PRESSURE: 110 MMHG | DIASTOLIC BLOOD PRESSURE: 70 MMHG

## 2019-03-10 VITALS — SYSTOLIC BLOOD PRESSURE: 88 MMHG | DIASTOLIC BLOOD PRESSURE: 53 MMHG

## 2019-03-10 VITALS — SYSTOLIC BLOOD PRESSURE: 110 MMHG | DIASTOLIC BLOOD PRESSURE: 62 MMHG

## 2019-03-10 VITALS — SYSTOLIC BLOOD PRESSURE: 93 MMHG | DIASTOLIC BLOOD PRESSURE: 59 MMHG

## 2019-03-10 VITALS — SYSTOLIC BLOOD PRESSURE: 96 MMHG | DIASTOLIC BLOOD PRESSURE: 54 MMHG

## 2019-03-10 VITALS — SYSTOLIC BLOOD PRESSURE: 97 MMHG | DIASTOLIC BLOOD PRESSURE: 52 MMHG

## 2019-03-10 VITALS — DIASTOLIC BLOOD PRESSURE: 61 MMHG | SYSTOLIC BLOOD PRESSURE: 103 MMHG

## 2019-03-10 VITALS — DIASTOLIC BLOOD PRESSURE: 64 MMHG | SYSTOLIC BLOOD PRESSURE: 95 MMHG

## 2019-03-10 VITALS — DIASTOLIC BLOOD PRESSURE: 58 MMHG | SYSTOLIC BLOOD PRESSURE: 92 MMHG

## 2019-03-10 VITALS — DIASTOLIC BLOOD PRESSURE: 57 MMHG | SYSTOLIC BLOOD PRESSURE: 93 MMHG

## 2019-03-10 VITALS — DIASTOLIC BLOOD PRESSURE: 56 MMHG | SYSTOLIC BLOOD PRESSURE: 87 MMHG

## 2019-03-10 VITALS — DIASTOLIC BLOOD PRESSURE: 55 MMHG | SYSTOLIC BLOOD PRESSURE: 101 MMHG

## 2019-03-10 VITALS — SYSTOLIC BLOOD PRESSURE: 94 MMHG | DIASTOLIC BLOOD PRESSURE: 58 MMHG

## 2019-03-10 VITALS — SYSTOLIC BLOOD PRESSURE: 101 MMHG | DIASTOLIC BLOOD PRESSURE: 64 MMHG

## 2019-03-10 VITALS — DIASTOLIC BLOOD PRESSURE: 69 MMHG | SYSTOLIC BLOOD PRESSURE: 99 MMHG

## 2019-03-10 VITALS — DIASTOLIC BLOOD PRESSURE: 54 MMHG | SYSTOLIC BLOOD PRESSURE: 86 MMHG

## 2019-03-10 VITALS — DIASTOLIC BLOOD PRESSURE: 53 MMHG | SYSTOLIC BLOOD PRESSURE: 89 MMHG

## 2019-03-10 LAB
ALBUMIN SERPL-MCNC: 2.5 G/DL (ref 3.4–5)
ALP SERPL-CCNC: 34 U/L (ref 46–116)
ALT SERPL-CCNC: 23 U/L (ref 10–68)
ANION GAP SERPL CALC-SCNC: 10.9 MMOL/L (ref 8–16)
BILIRUB SERPL-MCNC: 0.38 MG/DL (ref 0.2–1.3)
BUN SERPL-MCNC: 12 MG/DL (ref 7–18)
CALCIUM SERPL-MCNC: 7.8 MG/DL (ref 8.5–10.1)
CHLORIDE SERPL-SCNC: 105 MMOL/L (ref 98–107)
CO2 SERPL-SCNC: 29.3 MMOL/L (ref 21–32)
CREAT SERPL-MCNC: 0.7 MG/DL (ref 0.6–1.3)
ERYTHROCYTE [DISTWIDTH] IN BLOOD BY AUTOMATED COUNT: 13.9 % (ref 11.5–14.5)
GLOBULIN SER-MCNC: 2.8 G/L
GLUCOSE SERPL-MCNC: 123 MG/DL (ref 74–106)
HCT VFR BLD CALC: 29.9 % (ref 42–54)
HGB BLD-MCNC: 10 G/DL (ref 13.5–17.5)
MCH RBC QN AUTO: 33 PG (ref 26–34)
MCHC RBC AUTO-ENTMCNC: 33.4 G/DL (ref 31–37)
MCV RBC: 98.7 FL (ref 80–100)
OSMOLALITY SERPL CALC.SUM OF ELEC: 281 MOSM/KG (ref 275–300)
PLATELET # BLD EST: (no result) 10*3/UL
PLATELET # BLD: 99 10X3/UL (ref 130–400)
PMV BLD AUTO: 9.8 FL (ref 7.4–10.4)
POTASSIUM SERPL-SCNC: 4.2 MMOL/L (ref 3.5–5.1)
PROT SERPL-MCNC: 5.3 G/DL (ref 6.4–8.2)
RBC # BLD AUTO: 3.03 10X6/UL (ref 4.2–6.1)
SODIUM SERPL-SCNC: 141 MMOL/L (ref 136–145)
WBC # BLD AUTO: 8.3 10X3/UL (ref 4.8–10.8)

## 2019-03-10 NOTE — NUR
BEDSIDE SHIFT REPORT GIVEN BY DEPARTING RN. OOB IN BSC WITH ASSIST. IMMEDIATE
DIARRHEA. VSS. NSR. SAFETY MEASURES IN PLACE. CBIR.

## 2019-03-10 NOTE — NUR
0100 TOILETING AND FLUIDS OFFERED.  VSS. RESPIRATIONS EVEN AND UNLABORED.
DENIES ANY NEEDS AT THIS TIME.
 
0300 SEE ASSESSMENT FLOWSHEET.
 
0500 SUBSTERNAL CATHI DRAIN DRESSING CHANGED PER MD ORDERS. BATH GIVEN AND
LINENS CHANGED.

## 2019-03-10 NOTE — NUR
DR. ADAMS AT BEDSIDE. HE HOOKED TEMP PACER  UP AND SET AT VVI RATE OF 40
VMA 20. ACTUAL RATE 80, CONTROLLED AFIB. DRSG CHANGED PER ORDERS.

## 2019-03-10 NOTE — NUR
I CALLED DR. ADAMS ABOUT HR 'S AND HYPOTENSION. ORDERS REC'D FOR
CORDORONE 150MG BOLUS. And to hold laASIX.

## 2019-03-11 VITALS — DIASTOLIC BLOOD PRESSURE: 79 MMHG | SYSTOLIC BLOOD PRESSURE: 159 MMHG

## 2019-03-11 VITALS — SYSTOLIC BLOOD PRESSURE: 117 MMHG | DIASTOLIC BLOOD PRESSURE: 61 MMHG

## 2019-03-11 VITALS — SYSTOLIC BLOOD PRESSURE: 103 MMHG | DIASTOLIC BLOOD PRESSURE: 54 MMHG

## 2019-03-11 VITALS — SYSTOLIC BLOOD PRESSURE: 98 MMHG | DIASTOLIC BLOOD PRESSURE: 55 MMHG

## 2019-03-11 VITALS — SYSTOLIC BLOOD PRESSURE: 101 MMHG | DIASTOLIC BLOOD PRESSURE: 57 MMHG

## 2019-03-11 VITALS — DIASTOLIC BLOOD PRESSURE: 31 MMHG | SYSTOLIC BLOOD PRESSURE: 87 MMHG

## 2019-03-11 VITALS — DIASTOLIC BLOOD PRESSURE: 60 MMHG | SYSTOLIC BLOOD PRESSURE: 115 MMHG

## 2019-03-11 VITALS — DIASTOLIC BLOOD PRESSURE: 67 MMHG | SYSTOLIC BLOOD PRESSURE: 161 MMHG

## 2019-03-11 VITALS — SYSTOLIC BLOOD PRESSURE: 160 MMHG | DIASTOLIC BLOOD PRESSURE: 82 MMHG

## 2019-03-11 VITALS — DIASTOLIC BLOOD PRESSURE: 57 MMHG | SYSTOLIC BLOOD PRESSURE: 102 MMHG

## 2019-03-11 VITALS — DIASTOLIC BLOOD PRESSURE: 54 MMHG | SYSTOLIC BLOOD PRESSURE: 105 MMHG

## 2019-03-11 VITALS — SYSTOLIC BLOOD PRESSURE: 110 MMHG | DIASTOLIC BLOOD PRESSURE: 61 MMHG

## 2019-03-11 VITALS — DIASTOLIC BLOOD PRESSURE: 59 MMHG | SYSTOLIC BLOOD PRESSURE: 100 MMHG

## 2019-03-11 VITALS — SYSTOLIC BLOOD PRESSURE: 96 MMHG | DIASTOLIC BLOOD PRESSURE: 57 MMHG

## 2019-03-11 VITALS — SYSTOLIC BLOOD PRESSURE: 110 MMHG | DIASTOLIC BLOOD PRESSURE: 64 MMHG

## 2019-03-11 VITALS — DIASTOLIC BLOOD PRESSURE: 62 MMHG | SYSTOLIC BLOOD PRESSURE: 109 MMHG

## 2019-03-11 VITALS — DIASTOLIC BLOOD PRESSURE: 55 MMHG | SYSTOLIC BLOOD PRESSURE: 103 MMHG

## 2019-03-11 VITALS — DIASTOLIC BLOOD PRESSURE: 63 MMHG | SYSTOLIC BLOOD PRESSURE: 107 MMHG

## 2019-03-11 VITALS — SYSTOLIC BLOOD PRESSURE: 108 MMHG | DIASTOLIC BLOOD PRESSURE: 58 MMHG

## 2019-03-11 VITALS — DIASTOLIC BLOOD PRESSURE: 52 MMHG | SYSTOLIC BLOOD PRESSURE: 92 MMHG

## 2019-03-11 VITALS — SYSTOLIC BLOOD PRESSURE: 105 MMHG | DIASTOLIC BLOOD PRESSURE: 85 MMHG

## 2019-03-11 VITALS — SYSTOLIC BLOOD PRESSURE: 96 MMHG | DIASTOLIC BLOOD PRESSURE: 62 MMHG

## 2019-03-11 LAB
ALBUMIN SERPL-MCNC: 2.6 G/DL (ref 3.4–5)
ALP SERPL-CCNC: 32 U/L (ref 46–116)
ALT SERPL-CCNC: 21 U/L (ref 10–68)
ANION GAP SERPL CALC-SCNC: 11.5 MMOL/L (ref 8–16)
BILIRUB SERPL-MCNC: 0.7 MG/DL (ref 0.2–1.3)
BUN SERPL-MCNC: 13 MG/DL (ref 7–18)
CALCIUM SERPL-MCNC: 8.1 MG/DL (ref 8.5–10.1)
CHLORIDE SERPL-SCNC: 104 MMOL/L (ref 98–107)
CO2 SERPL-SCNC: 28.7 MMOL/L (ref 21–32)
CREAT SERPL-MCNC: 0.8 MG/DL (ref 0.6–1.3)
ERYTHROCYTE [DISTWIDTH] IN BLOOD BY AUTOMATED COUNT: 14.1 % (ref 11.5–14.5)
GLOBULIN SER-MCNC: 3 G/L
GLUCOSE SERPL-MCNC: 119 MG/DL (ref 74–106)
HCT VFR BLD CALC: 28.7 % (ref 42–54)
HGB BLD-MCNC: 9.6 G/DL (ref 13.5–17.5)
MCH RBC QN AUTO: 32.9 PG (ref 26–34)
MCHC RBC AUTO-ENTMCNC: 33.4 G/DL (ref 31–37)
MCV RBC: 98.3 FL (ref 80–100)
OSMOLALITY SERPL CALC.SUM OF ELEC: 279 MOSM/KG (ref 275–300)
PLATELET # BLD: 134 10X3/UL (ref 130–400)
PMV BLD AUTO: 10.5 FL (ref 7.4–10.4)
POTASSIUM SERPL-SCNC: 4.2 MMOL/L (ref 3.5–5.1)
PROT SERPL-MCNC: 5.6 G/DL (ref 6.4–8.2)
RBC # BLD AUTO: 2.92 10X6/UL (ref 4.2–6.1)
SODIUM SERPL-SCNC: 140 MMOL/L (ref 136–145)
WBC # BLD AUTO: 6.9 10X3/UL (ref 4.8–10.8)

## 2019-03-11 NOTE — NUR
REASSESSMENT COMPLETE. NO CHANGES NOTED. VSS. ASLEEP SHOWING NO SS OF
DISTRESS. [place] ANY PAIN OR NEEDS AT THIS TIME. SAFETY MEASURES IN PLACE.
CBIR.

## 2019-03-11 NOTE — NUR
REPORT RECEIVED FROM THE OFF GOING RN. SEE ASSESSMENT IN THE PTS FLOW SHEET.
PT OOB IN HIS BEDSIDE CHAIR. VSS AT THIS TIME. NSR. RIGHT IJ CVL NOTED. SEE IV
FLOW SHEET FOR DETAILS. MIDSTERNAL DRESSING C/D/I. SUBSTERNAL DRESSIG C/D/I.
TPM WIRES CONNECTED TO THE TPM. VVI: 40 0.8 SENSITIVITY, VMA 20. LEFT CATHI
DRAIN NOTED WITH SEROUSANGENIOUS DRAINAGED. BULB COMPRSSED. PT C/O OCCATIONAL
LEFT SUBSTERNAL PAIN. PRN PERCOCET GIVEN. SEE MAR. RLE HARVEST SITE NOTED SUYAPA.
SITES WELL APPROXIMATED. INSTRUCTED THE PT TO USE HIS IS 10X'S/H. PT PULLS
ABOUT 500 . CALL LIGHT IN REACH. WILL CONT POC.

## 2019-03-11 NOTE — NUR
IV STARTED X1 ATTEMPT TO RIGHT ARM. PATENT. DRESSING C/D/I. RIGHT IJ CENTRAL
LINE DC'D PER DR QUINN ORDERS. LEFT CATHI DRAIN REMOVED PER ORDERS. TPM
WIRES DISCONNECTED AND WIRES COILED. SUBSTERNAL DRESSING CHANGED AND C/D/I. PT
TOLERATED WELL. WILL CONT POC.

## 2019-03-11 NOTE — TEE
PATIENT:CANDACE DESOUZA EDD                 MEDICAL RECORD: [id number]
                                               LOCATION:[place] [address]
AGE OF PATIENT: 73                             ADMISSION DATE: 03/07/19
SEX: M   
 
REFERRING PHYSICIAN:                                                             
 
INTERPRETING PHYSICIAN: MINI CASSIDY MD             

 
 
                         TRANSESOPHAGEAL ECHOCARDIOGRAM
 Date:              03/07/19      PATT CHARGE Y
                INDICATIONS: CABG                     
 
             PREMEDICATIONS:                               
 
PATIENT'S RESPONSE PROCEDURE                          
 
                     DOPPLER MEASUREMENTS:
            LVIT            LA           PA           RA      
            LVOT          RVOT      Asc. Ao      
AV Gradient Peak       AV Mean      AV Area      
MV Gradient Peak       MV Mean      MV Area      
 INTERPRETATION: LVd: 5.7 cm              
                 LVs: 3.6 cm              
 
 
 
               Doppler:                          
 
                   2-D:                          
 
     COLOR FLOW DOPPLER                          
 
   NORMAL SALINE STUDY:                     
 
          MISCELLANOUS:                          
 
             DIAGNOSIS:                          
 
                  PLAN:                          
 
           Cardiologist:Win Rivera                
   Cardiac Sonographer: [place]            
              COMMENTS:                                              
                                                                                     
 
 
DATE OF SERVICE:  03/07/2019
 
PROCEDURE:  Transesophageal echo evaluation of valvular structures during bypass
surgery.
 
FINDINGS:
1.  Left ventricular chamber size is within normal limits.  Left ventricular
systolic function is normal.  Overall ejection fraction estimated at 60%.
2.  Left atrium, right atrium, and right ventricular chamber sizes are within
 
 
 
TRANSESOPHAGEAL ECHOCARDIOGRAM REPORT                    [id number]    CANDACE DESOUZA ED
 
 
normal limits.
3.  Valvular structures have normal structure and motion.
4.  Doppler interrogation reveals trace mitral regurgitation and mild aortic
insufficiency.  No other valvular insufficiency or stenosis.
5.  No evidence of pericardial effusion or left ventricular thrombus.
 
TRANSINT:[id number] Voice Confirmation ID: [id number] DOCUMENT ID: [id number]
 
 
 
Electronically Signed by MINI CASSIDY on 03/11/19 at 1055
 
 
 
 
 
 
 
 
 
 
 
 
 
 
 
 
 
 
 
 
 
 
 
 
 
 
 
 
 
 
 
 
 
 
 
CC:                                                             [id number]
DICTATION DATE: 03/07/19 1629     :     03/08/19 0242      ADM IN  
                                                                              
[place] [id number] [place] [address]

## 2019-03-11 NOTE — NUR
BEDSIDE SHIFT REPORT GIVEN BY DEPARTING RN. PT OUT OF CHAIR TO BSC USING
ASSIST. SLIGHTLY UNSTEADY ON FEET. ONE BM AND VOID NOTED. BACK TO BED AND
TUCKED IN. VSS. SAFETY MEASURESIN PLACE. CBIR.

## 2019-03-12 VITALS — SYSTOLIC BLOOD PRESSURE: 102 MMHG | DIASTOLIC BLOOD PRESSURE: 65 MMHG

## 2019-03-12 VITALS — SYSTOLIC BLOOD PRESSURE: 113 MMHG | DIASTOLIC BLOOD PRESSURE: 62 MMHG

## 2019-03-12 VITALS — DIASTOLIC BLOOD PRESSURE: 68 MMHG | SYSTOLIC BLOOD PRESSURE: 123 MMHG

## 2019-03-12 VITALS — SYSTOLIC BLOOD PRESSURE: 98 MMHG | DIASTOLIC BLOOD PRESSURE: 55 MMHG

## 2019-03-12 VITALS — SYSTOLIC BLOOD PRESSURE: 114 MMHG | DIASTOLIC BLOOD PRESSURE: 67 MMHG

## 2019-03-12 VITALS — DIASTOLIC BLOOD PRESSURE: 62 MMHG | SYSTOLIC BLOOD PRESSURE: 122 MMHG

## 2019-03-12 VITALS — SYSTOLIC BLOOD PRESSURE: 113 MMHG | DIASTOLIC BLOOD PRESSURE: 57 MMHG

## 2019-03-12 VITALS — DIASTOLIC BLOOD PRESSURE: 62 MMHG | SYSTOLIC BLOOD PRESSURE: 110 MMHG

## 2019-03-12 VITALS — SYSTOLIC BLOOD PRESSURE: 100 MMHG | DIASTOLIC BLOOD PRESSURE: 54 MMHG

## 2019-03-12 VITALS — DIASTOLIC BLOOD PRESSURE: 53 MMHG | SYSTOLIC BLOOD PRESSURE: 91 MMHG

## 2019-03-12 VITALS — SYSTOLIC BLOOD PRESSURE: 85 MMHG | DIASTOLIC BLOOD PRESSURE: 56 MMHG

## 2019-03-12 VITALS — DIASTOLIC BLOOD PRESSURE: 53 MMHG | SYSTOLIC BLOOD PRESSURE: 96 MMHG

## 2019-03-12 VITALS — DIASTOLIC BLOOD PRESSURE: 60 MMHG | SYSTOLIC BLOOD PRESSURE: 102 MMHG

## 2019-03-12 VITALS — SYSTOLIC BLOOD PRESSURE: 111 MMHG | DIASTOLIC BLOOD PRESSURE: 65 MMHG

## 2019-03-12 VITALS — SYSTOLIC BLOOD PRESSURE: 90 MMHG | DIASTOLIC BLOOD PRESSURE: 62 MMHG

## 2019-03-12 VITALS — SYSTOLIC BLOOD PRESSURE: 94 MMHG | DIASTOLIC BLOOD PRESSURE: 59 MMHG

## 2019-03-12 LAB
ALBUMIN SERPL-MCNC: 2.8 G/DL (ref 3.4–5)
ALP SERPL-CCNC: 36 U/L (ref 46–116)
ALT SERPL-CCNC: 22 U/L (ref 10–68)
ANION GAP SERPL CALC-SCNC: 12.3 MMOL/L (ref 8–16)
BILIRUB SERPL-MCNC: 1.06 MG/DL (ref 0.2–1.3)
BUN SERPL-MCNC: 13 MG/DL (ref 7–18)
CALCIUM SERPL-MCNC: 8.6 MG/DL (ref 8.5–10.1)
CHLORIDE SERPL-SCNC: 102 MMOL/L (ref 98–107)
CO2 SERPL-SCNC: 27.7 MMOL/L (ref 21–32)
CREAT SERPL-MCNC: 0.8 MG/DL (ref 0.6–1.3)
ERYTHROCYTE [DISTWIDTH] IN BLOOD BY AUTOMATED COUNT: 13.9 % (ref 11.5–14.5)
GLOBULIN SER-MCNC: 3.3 G/L
GLUCOSE SERPL-MCNC: 107 MG/DL (ref 74–106)
HCT VFR BLD CALC: 29 % (ref 42–54)
HGB BLD-MCNC: 9.6 G/DL (ref 13.5–17.5)
MCH RBC QN AUTO: 32.7 PG (ref 26–34)
MCHC RBC AUTO-ENTMCNC: 33.1 G/DL (ref 31–37)
MCV RBC: 98.6 FL (ref 80–100)
OSMOLALITY SERPL CALC.SUM OF ELEC: 275 MOSM/KG (ref 275–300)
PLATELET # BLD: 149 10X3/UL (ref 130–400)
PMV BLD AUTO: 10.1 FL (ref 7.4–10.4)
POTASSIUM SERPL-SCNC: 4 MMOL/L (ref 3.5–5.1)
PROT SERPL-MCNC: 6.1 G/DL (ref 6.4–8.2)
RBC # BLD AUTO: 2.94 10X6/UL (ref 4.2–6.1)
SODIUM SERPL-SCNC: 138 MMOL/L (ref 136–145)
WBC # BLD AUTO: 6.5 10X3/UL (ref 4.8–10.8)

## 2019-03-12 NOTE — NUR
SITTING IN CHAIR, ASSISSTED OUT OF CHAIR TO USE BATHROOM, X1 SOFT BROWN BM
NOTED, ASSISSTED TO BED. SCHEDULED MEDS GIVEN, WATER PROVIDED. DENIES OTHER
NEEDS. CALL LIGHT WITHIN REACH. WILL CONTINUE TO MONITOR.

## 2019-03-12 NOTE — NUR
SON PASSED NURSE DESK WHILE LEAVING ICU UNIT AND RAISE A BAG WITH 2 FLASKS IN
IT AND INDICATED HE WAS TAKING THEM HOME WITH HIM.

## 2019-03-12 NOTE — NUR
PHYSCIAL THEARPY AT THE PTS BEDSIDE DOING MINOR EXCERSISES AND DID NOT GO FOR
A WALK DUE TO UNCONTROLLED AFIB .

## 2019-03-12 NOTE — NUR
REPORT RECEIVED, SHIFT ASSESSMENT COMPLETED PER FLOW SHEET. AAOX4. SITTING UP
IN CHAIR. PPP. TELEMETRY MONITORING , A-FIB. RT FOREARM PIV PATENT, NO
SIGNS OF INFECTION OR INFILTRATION. DENIES NEEDS. CALM AND COOPERATIVE. SEE
FLOW SHEET FOR COMPLETE ASSESSMENT. CALL LIGHT WITHIN REACH. WILL CONTINUE TO
MONITOR.

## 2019-03-12 NOTE — NUR
REPORT RECEVIED FROM THE OFF GOING RN. SEE ASSESSMENT IN THE PTS FLOW SHEET.
DURING REPORT, THE PT WAS ALREADY ON THE BSC HAVING A BM. PT WAS ASSISTED TO
THE BEDSIDE CHAIR. BM AND URINE NOTED IN THE COMMODE. VSS. NSR ON THE MONITOR.
PT DENIES PAIN/NEEDS AT THIS TIME. MIDSTERNAL AND SUBSTERNAL DRESSING C/D/I.
RLE INCISIONS WELL APPROXIMATED AND SHOW NO S/SX OF INFECTION. BREAKFAST TRAY
PROVIDED FOR THE PT. PT INSTRUCTED TO USE HIS IS 10X'S/H. PT PULLING ABOUT
1500 TO 1750 ON HIS IS. CALL LIGHT IN REACH. WILL CONT POC.

## 2019-03-12 NOTE — NUR
SPOKE WITH DR QUINN NURSE AND UPDATED THAT THE PT IS STILL IN AFIB HOWEVER
HIS RATE IS <100. SEE ORDERS.

## 2019-03-12 NOTE — NUR
2 FLASKS OF WHAT LOOKS AND SMELLS LIKE WHISKEY. BOTH FLASK WHERE CONFISCATED.
I EXPLAINED TO THE PT THAT HE HAS WHISKEY PRESCRIBED TO HIM. "I KNOW. MY SON
BROUGHT IT TO ME." PT OK AND STATED HIS SON WILL COME BY LATER AND  THE
FLASKS.

## 2019-03-12 NOTE — NUR
DR ADAMS PAGED R/T PAC'S AND PVC'S. DR ADAMS AWARE OF VSS/LABS AND THAT THE
PT HAS HAD AMIODORONE 200 THIS AM. N.O FOR AMIODORONE 400MG PO NOW.

## 2019-03-12 NOTE — NUR
REASSESSMENT COMPLETED PER FLOW SHEET, SEE FOR DETAILS. PATIENT CONVERTED TO
SINUS RHYTHM ON MONITOR AT 2310 HOURS. NO ACUTE DISTRESS NOTED. CALL LIGHT
WITHIN REACH. WILL CONTINUE TO MONITOR.

## 2019-03-12 NOTE — NUR
PT RECEIVED A FULL BEDBATH AND LINEN CHANGE. PT SUBSTERNAL DRESSING CHANGED.
PT TOLRATED WELL. VSS AT THIS TIME. CALL LIGHT IN REACH. WILL CONT POC.

## 2019-03-13 VITALS — SYSTOLIC BLOOD PRESSURE: 100 MMHG | DIASTOLIC BLOOD PRESSURE: 67 MMHG

## 2019-03-13 VITALS — SYSTOLIC BLOOD PRESSURE: 108 MMHG | DIASTOLIC BLOOD PRESSURE: 59 MMHG

## 2019-03-13 VITALS — SYSTOLIC BLOOD PRESSURE: 111 MMHG | DIASTOLIC BLOOD PRESSURE: 57 MMHG

## 2019-03-13 VITALS — DIASTOLIC BLOOD PRESSURE: 62 MMHG | SYSTOLIC BLOOD PRESSURE: 118 MMHG

## 2019-03-13 VITALS — DIASTOLIC BLOOD PRESSURE: 60 MMHG | SYSTOLIC BLOOD PRESSURE: 101 MMHG

## 2019-03-13 VITALS — DIASTOLIC BLOOD PRESSURE: 51 MMHG | SYSTOLIC BLOOD PRESSURE: 97 MMHG

## 2019-03-13 VITALS — DIASTOLIC BLOOD PRESSURE: 48 MMHG | SYSTOLIC BLOOD PRESSURE: 90 MMHG

## 2019-03-13 VITALS — DIASTOLIC BLOOD PRESSURE: 66 MMHG | SYSTOLIC BLOOD PRESSURE: 108 MMHG

## 2019-03-13 VITALS — DIASTOLIC BLOOD PRESSURE: 70 MMHG | SYSTOLIC BLOOD PRESSURE: 118 MMHG

## 2019-03-13 VITALS — DIASTOLIC BLOOD PRESSURE: 61 MMHG | SYSTOLIC BLOOD PRESSURE: 103 MMHG

## 2019-03-13 VITALS — SYSTOLIC BLOOD PRESSURE: 115 MMHG | DIASTOLIC BLOOD PRESSURE: 71 MMHG

## 2019-03-13 VITALS — SYSTOLIC BLOOD PRESSURE: 102 MMHG | DIASTOLIC BLOOD PRESSURE: 61 MMHG

## 2019-03-13 VITALS — SYSTOLIC BLOOD PRESSURE: 120 MMHG | DIASTOLIC BLOOD PRESSURE: 67 MMHG

## 2019-03-13 VITALS — DIASTOLIC BLOOD PRESSURE: 51 MMHG | SYSTOLIC BLOOD PRESSURE: 94 MMHG

## 2019-03-13 VITALS — DIASTOLIC BLOOD PRESSURE: 61 MMHG | SYSTOLIC BLOOD PRESSURE: 102 MMHG

## 2019-03-13 VITALS — SYSTOLIC BLOOD PRESSURE: 91 MMHG | DIASTOLIC BLOOD PRESSURE: 51 MMHG

## 2019-03-13 VITALS — DIASTOLIC BLOOD PRESSURE: 50 MMHG | SYSTOLIC BLOOD PRESSURE: 93 MMHG

## 2019-03-13 VITALS — SYSTOLIC BLOOD PRESSURE: 106 MMHG | DIASTOLIC BLOOD PRESSURE: 54 MMHG

## 2019-03-13 VITALS — DIASTOLIC BLOOD PRESSURE: 61 MMHG | SYSTOLIC BLOOD PRESSURE: 100 MMHG

## 2019-03-13 VITALS — DIASTOLIC BLOOD PRESSURE: 59 MMHG | SYSTOLIC BLOOD PRESSURE: 109 MMHG

## 2019-03-13 VITALS — SYSTOLIC BLOOD PRESSURE: 91 MMHG | DIASTOLIC BLOOD PRESSURE: 50 MMHG

## 2019-03-13 VITALS — DIASTOLIC BLOOD PRESSURE: 65 MMHG | SYSTOLIC BLOOD PRESSURE: 113 MMHG

## 2019-03-13 VITALS — DIASTOLIC BLOOD PRESSURE: 58 MMHG | SYSTOLIC BLOOD PRESSURE: 101 MMHG

## 2019-03-13 LAB
ALBUMIN SERPL-MCNC: 2.6 G/DL (ref 3.4–5)
ALP SERPL-CCNC: 35 U/L (ref 46–116)
ALT SERPL-CCNC: 22 U/L (ref 10–68)
ANION GAP SERPL CALC-SCNC: 12 MMOL/L (ref 8–16)
BASOPHILS NFR BLD AUTO: 0.9 % (ref 0–2)
BILIRUB SERPL-MCNC: 0.8 MG/DL (ref 0.2–1.3)
BUN SERPL-MCNC: 15 MG/DL (ref 7–18)
CALCIUM SERPL-MCNC: 8 MG/DL (ref 8.5–10.1)
CHLORIDE SERPL-SCNC: 106 MMOL/L (ref 98–107)
CO2 SERPL-SCNC: 26 MMOL/L (ref 21–32)
CREAT SERPL-MCNC: 0.8 MG/DL (ref 0.6–1.3)
EOSINOPHIL NFR BLD: 7.2 % (ref 0–7)
ERYTHROCYTE [DISTWIDTH] IN BLOOD BY AUTOMATED COUNT: 13.5 % (ref 11.5–14.5)
GLOBULIN SER-MCNC: 2.9 G/L
GLUCOSE SERPL-MCNC: 110 MG/DL (ref 74–106)
HCT VFR BLD CALC: 27.4 % (ref 42–54)
HGB BLD-MCNC: 9.3 G/DL (ref 13.5–17.5)
IMM GRANULOCYTES NFR BLD: 0.2 % (ref 0–5)
LYMPHOCYTES NFR BLD AUTO: 21.4 % (ref 15–50)
MCH RBC QN AUTO: 32.7 PG (ref 26–34)
MCHC RBC AUTO-ENTMCNC: 33.9 G/DL (ref 31–37)
MCV RBC: 96.5 FL (ref 80–100)
MONOCYTES NFR BLD: 17.2 % (ref 2–11)
NEUTROPHILS NFR BLD AUTO: 53.1 % (ref 40–80)
OSMOLALITY SERPL CALC.SUM OF ELEC: 280 MOSM/KG (ref 275–300)
PLATELET # BLD: 158 10X3/UL (ref 130–400)
PMV BLD AUTO: 10 FL (ref 7.4–10.4)
POTASSIUM SERPL-SCNC: 4 MMOL/L (ref 3.5–5.1)
PROT SERPL-MCNC: 5.5 G/DL (ref 6.4–8.2)
RBC # BLD AUTO: 2.84 10X6/UL (ref 4.2–6.1)
SODIUM SERPL-SCNC: 140 MMOL/L (ref 136–145)
WBC # BLD AUTO: 5.5 10X3/UL (ref 4.8–10.8)

## 2019-03-13 NOTE — NUR
CALL LIGHT ANSWERED, ASSISSTED OOB TO BR. X1 SOFT BROWN BM NOTED. ASSISSTED
BACK TO BED. DENIES OTHER NEEDS. WILL CONTINUE TO MONITOR.

## 2019-03-13 NOTE — NUR
PT AMULATED WITH PYSICAL THEARPY ABOUT 500 FEET WITH A NORMAL STEADY GAIT.
REMAINED IN NSR. VSS. PT TOLERATED WELL.

## 2019-03-13 NOTE — NUR
SCHEDULED MEDS GIVEN, WATER PROVIDED. ASSISSTED HIM TO BED. DENIES OTHER
NEEDS. WILL CONTINUE TO MONITOR. CALL LIGHT WITHIN REACH.

## 2019-03-13 NOTE — NUR
REPORT RECEIVED, SHIFT ASSESSMENT COMPLETED PER FLOW SHEET. AAOX4. PPP. RT
FOREARM PIV PATENT, NO SIGNS OF INFECTION OR INFILTRATION. DENIES NEEDS AT
THIS TIME. SEE FLOW SHEET FOR COMPLETE ASSESSMENT. CALL LIGHT WITHIN REACH.
WILL CONTINUE TO MONITOR.

## 2019-03-13 NOTE — NUR
Regular diet with 70% average po intake. Pt reports appetite is improved
Weight 228lb
Pt reports drinking some Ensure
Pt reports no questions about nutrition at this time
RD following

## 2019-03-13 NOTE — NUR
REASSESSMENT COMPLETED PER FLOW SHEET, SEE FOR DETAILS. NO ACUTE DISTRESS
NOTED. ASSISSTED OOB TO USE BATHROOM, X1 SOFT BROWN BM. ASSISSTED BACK IN BED.
DENIES OTHER NEEDS. CALL LIGHT WITHIN REACH. WILL CONTINUE TO MONITOR.

## 2019-03-13 NOTE — NUR
PT ONLY DRANK 2/4 BOTTLES OF PRESCRIBED BOURBON FROM YESTERDAY. PT MADE HIS
MIXED DRINK OF WHISKEY AND DIET COKE AND HAD 2 REMAINING BOTTLES LEFT IN HIS
ROOM FROM 03/12/19. PT MADE A DRINK FROM THE REMAINING 2 BOTTLES AND HE STATED
THAT IS ALL THAT HE WANTS.

## 2019-03-13 NOTE — NUR
SUBSTERNAL DRESSING CHANGED PER ORDERS. TPM WIRES REMAIN INTACT AND COILED.
OLD CT SITES SHOW NO S/SX OF INFECTION. DRESSING APPLIED AND C/D/I. PT
TOLERATED WELL. WILL CONT POC.

## 2019-03-13 NOTE — NUR
REPORT RECIEVED FROM THE OFF GOING RN. SEE ASSESSMENT IN THE PTS FLOW SHEET.
PT NSR ON THE MONIOTR. VSS AT THIS TIME. MIDSTERNAL DRESSING C/D/I. SUBSTERNAL
DRESSING C/D/I WITH TPM WIRES COILDED. RLE INCISIONS SUYAPA AND WELL
APPROXIMATED. NO S/SX OF INFECTION NOTED. PT DENIES PAIN/NEEDS AT THIS TIME.
PT INSTRUCTED TO USE IS 10X'S/H. PULLS ABOUT [phone] ON HIS IS. BREAKFAST
TRAY PROVIDED FOR THE PT. CALL LIGHT IN REACH. WILL CONT POC.

## 2019-03-13 NOTE — NUR
PT RESTING WITH HIS EYES CLOSD WITH NO S/SX OF DISTRESS/DISCOMFORT. REMAINS IN
NSR. CALL LIGHT IN REACH. WILL CONT POC.

## 2019-03-13 NOTE — NUR
REASSESSMENT COMPLETED PER FLOW SHEET, SEE FOR DETAILS. NO ACUTE DISTRESS
NOTED. DENIES NEEDS. CALL LIGHT WITHIN REACH. WILL CONTINUE TO MONITOR.

## 2019-03-13 NOTE — NUR
PHSICAL THEARPY AMBULATED WITH THE PT. PT AMBULATED 1000 FEET WITH NO ISSUES.
VSS. CALL LIGHT IN REACH. WILL CONT POC.

## 2019-03-13 NOTE — NUR
ASSISSTED OOB TO CHAIR, CALL LIGHT WITHIN REACH. WATER WITH ICE PROVIDED.
DENIES OTHER NEEDS. WILL CONTINUE TO MONITOR.

## 2019-03-14 VITALS — DIASTOLIC BLOOD PRESSURE: 59 MMHG | SYSTOLIC BLOOD PRESSURE: 92 MMHG

## 2019-03-14 VITALS — DIASTOLIC BLOOD PRESSURE: 54 MMHG | SYSTOLIC BLOOD PRESSURE: 97 MMHG

## 2019-03-14 VITALS — DIASTOLIC BLOOD PRESSURE: 54 MMHG | SYSTOLIC BLOOD PRESSURE: 106 MMHG

## 2019-03-14 VITALS — SYSTOLIC BLOOD PRESSURE: 96 MMHG | DIASTOLIC BLOOD PRESSURE: 54 MMHG

## 2019-03-14 VITALS — DIASTOLIC BLOOD PRESSURE: 58 MMHG | SYSTOLIC BLOOD PRESSURE: 113 MMHG

## 2019-03-14 VITALS — DIASTOLIC BLOOD PRESSURE: 57 MMHG | SYSTOLIC BLOOD PRESSURE: 109 MMHG

## 2019-03-14 VITALS — SYSTOLIC BLOOD PRESSURE: 103 MMHG | DIASTOLIC BLOOD PRESSURE: 56 MMHG

## 2019-03-14 VITALS — SYSTOLIC BLOOD PRESSURE: 99 MMHG | DIASTOLIC BLOOD PRESSURE: 61 MMHG

## 2019-03-14 VITALS — DIASTOLIC BLOOD PRESSURE: 53 MMHG | SYSTOLIC BLOOD PRESSURE: 108 MMHG

## 2019-03-14 VITALS — SYSTOLIC BLOOD PRESSURE: 106 MMHG | DIASTOLIC BLOOD PRESSURE: 59 MMHG

## 2019-03-14 VITALS — DIASTOLIC BLOOD PRESSURE: 61 MMHG | SYSTOLIC BLOOD PRESSURE: 122 MMHG

## 2019-03-14 VITALS — SYSTOLIC BLOOD PRESSURE: 99 MMHG | DIASTOLIC BLOOD PRESSURE: 60 MMHG

## 2019-03-14 LAB
ALBUMIN SERPL-MCNC: 3 G/DL (ref 3.4–5)
ALP SERPL-CCNC: 47 U/L (ref 46–116)
ALT SERPL-CCNC: 25 U/L (ref 10–68)
ANION GAP SERPL CALC-SCNC: 12.6 MMOL/L (ref 8–16)
BASOPHILS NFR BLD AUTO: 0.4 % (ref 0–2)
BILIRUB SERPL-MCNC: 0.97 MG/DL (ref 0.2–1.3)
BUN SERPL-MCNC: 15 MG/DL (ref 7–18)
CALCIUM SERPL-MCNC: 8.6 MG/DL (ref 8.5–10.1)
CHLORIDE SERPL-SCNC: 104 MMOL/L (ref 98–107)
CO2 SERPL-SCNC: 28.3 MMOL/L (ref 21–32)
CREAT SERPL-MCNC: 0.9 MG/DL (ref 0.6–1.3)
EOSINOPHIL NFR BLD: 8 % (ref 0–7)
ERYTHROCYTE [DISTWIDTH] IN BLOOD BY AUTOMATED COUNT: 13.6 % (ref 11.5–14.5)
GLOBULIN SER-MCNC: 3.5 G/L
GLUCOSE SERPL-MCNC: 112 MG/DL (ref 74–106)
HCT VFR BLD CALC: 29.7 % (ref 42–54)
HGB BLD-MCNC: 10 G/DL (ref 13.5–17.5)
IMM GRANULOCYTES NFR BLD: 0.5 % (ref 0–5)
LYMPHOCYTES NFR BLD AUTO: 22.6 % (ref 15–50)
MCH RBC QN AUTO: 32.8 PG (ref 26–34)
MCHC RBC AUTO-ENTMCNC: 33.7 G/DL (ref 31–37)
MCV RBC: 97.4 FL (ref 80–100)
MONOCYTES NFR BLD: 12.3 % (ref 2–11)
NEUTROPHILS NFR BLD AUTO: 56.2 % (ref 40–80)
OSMOLALITY SERPL CALC.SUM OF ELEC: 282 MOSM/KG (ref 275–300)
PLATELET # BLD: 196 10X3/UL (ref 130–400)
PMV BLD AUTO: 9.7 FL (ref 7.4–10.4)
POTASSIUM SERPL-SCNC: 3.9 MMOL/L (ref 3.5–5.1)
PROT SERPL-MCNC: 6.5 G/DL (ref 6.4–8.2)
RBC # BLD AUTO: 3.05 10X6/UL (ref 4.2–6.1)
SODIUM SERPL-SCNC: 141 MMOL/L (ref 136–145)
WBC # BLD AUTO: 7.3 10X3/UL (ref 4.8–10.8)

## 2019-03-14 NOTE — NUR
SPOKE WITH BRYAN ABOUT THERAGAN AND HEMOCYTE BOTH BEING ORDERED. HE STATED TO
KEEP WHAT EVER DR TRAN PRESCRIBED.

## 2019-03-14 NOTE — NUR
REPORT RECIEVED FROM THE OFF GOING RN. SEE ASSESSMENT IN THE PTS FLOW SHEET.
PT SITTING OOB IN HIS BEDSIDE CHAIR. VSS. NSR ON THE MONITOR. PT READING A
NEWSPAPER. PT VERBALIZED HE WILL USE HIS IS 10X'S/H. PT PULLS ABOUT [phone].
PT DENIES PAIN. BREAKFAST TRAY PROVIDED FOR THE PT. INSTRUCTED AFTER
BREAKFAST, THAT HE NEEDS TO GO FOR A WALK. PT STATED THAT HE WOULD. CALL LIGHT
IN REACH. WILL CONT PC.

## 2019-03-14 NOTE — MORECARE
CASE MANAGEMENT DISCHARGE SUMMARY
 
 
PATIENT: CANDACE DESOUZA MITZY                     UNIT: [id number]
ACCOUNT#: [id number]                       ADM DATE: 19
AGE: 73     : 45  SEX: M            [facility]    
AUTHOR: GENNY GREENE                             PHYSICIAN:                               
 
REFERRING PHYSICIAN: MARGARETTE LAU MD            
DATE OF SERVICE: 19
Discharge Plan
 
 
Patient Name: CANDACE DESOUZA
Facility: [facility]:[place]
Encounter #: [id number]
Medical Record #: [id number]
: 1945
Planned Disposition: Home
Anticipated Discharge Date: 
 
Discharge Date: 2019
Expected LOS: 
Initial Reviewer: [id number]
Initial Review Date: 2019
Generated: 3/14/19   4:55 pm 
Comments
 
DCP- Discharge Planning
 
Updated by [email or web address]: Olya Montero on 3/14/19   2:53 pm CT
Patient Name:  CANDACE DESOUZA   
Encounter No:  [id number]   
:  1945   
Primary Insurance:  MEDICARE A & B  
Anticipated DC Date:    
Planned Disposition:  Home  
External Planned Provider: :   
D/C IMM EXPLAINED SERVED 3/14/19 @ 1215  
  
DCP follow-up note: Patient and family in agreement with discharge plan. No 
changes to plan. Case management will follow and assist as needed.  
Olya Montero
DCP- Discharge Planning
 
Updated by [email or web address]: Olya Montero on 3/8/19   4:36 pm CT
Patient Name: CANDACE DESOUZA                                     
Admission Status: Urgent   
Accout number: [id number]                              
Admission Date: 2019   
 
: 1945                                                        
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   
Attending: MARGARETTE LAU                                                
Current LOS:  1   
  
Anticipated DC Date:    
Planned Disposition: Home   
Primary Insurance: MEDICARE A & B   
  
  
Discharge Planning Comments: CM met with patient  at bedside after obtaining 
verbal consent.  Patient states he plans on returning home after discharge 
with his son.  Patient states he will have family transport him home via 
private vehicle.  Patient denies any discharge needs at this time. Patient 
may need walk test if still requiring 02. CM will continue to follow and 
assist as needed for discharge planning / needs.  
  
  
  
  
  
  
: Olya Montero
 DCPIA - Discharge Planning Initial Assessment
 
Updated by [email or web address]: Olya Montero on 3/8/19   6:33 pm
*  Is the patient Alert and Oriented?
Yes
*  How many steps to enter\exit or inside your home?
 
*  PCP
[facility]
*  Pharmacy
[facility]
*  Preadmission Environment
Home with Family
*  ADLs
Independent
*  Equipment
Cane
*  Other Equipment
GRAB BARS IN BATH ROOM
*  List name and contact numbers for known caregivers / representatives who 
currently or will assist patient after discharge:
MIAH DESOUZA - BROTHER - [phone]  ORLANDO DESOUZA - SON - [phone]  MIGUEL RICE - FRIEND- [phone]
 
*  Verbal permission to speak to the caregivers and representatives has been 
obtained from the patient.
No
*  Community resources currently utilized
None
 
*  Additional services required to return to the preadmission environment?
No
*  Can the patient safely return to the preadmission environment?
Yes
*  Has this patient been hospitalized within the prior 30 days at any 
hospital?
No
 
 
 
 
 
Coverage Notice
 
Reviewer: [email or web address] - Olya Montero
 
Notice Issued Date-Time: 2019  15:52
Notice Type: IM Discharge Notice
 
Notice Delivered To: Patient
Relationship to Patient: Self
Representative Name: 
 
Delivery Method: HAND - Hand Delivered
Deann Days:
Prior Verbal Notification: 
 
Recipient Understood Notice: Yes
Recipient Signature: Yes
Med Rec Note Co-signed by Attending:
 
Coverage Notice Comment:  
 
Last DP export: 3/8/19   4:42 pm
Patient Name: CANDACE DESOUZA
 
Encounter #: [id number]
Page [id number]
 
 
 
 
 
Electronically Signed by GENNY GREENE on 19 at 1555
 
 
 
 
 
 
**All edits/amendments must be made on the electronic document**
 
DICTATION DATE: 19     : SANIYA  19 155     
RPT#: [id number]                                DC DATE:19
                                               STATUS: DIS IN  
[facility]
[address] [place] [address]
***END OF REPORT***

## 2019-03-14 NOTE — NUR
SUBSTERNAL DRESSING CHANGED USING STERILE TECHNIQUE. ASSISSTED OOB TO CHAIR.
CALL LIGHT WITHIN REACH.

## 2019-03-14 NOTE — MORECARE
CASE MANAGEMENT DISCHARGE SUMMARY
 
 
PATIENT: CANDACE DESOUZA MITZY                     UNIT: [id number]
ACCOUNT#: [id number]                       ADM DATE: 19
AGE: 73     : 45  SEX: M            [facility]    
AUTHOR: GENNY GREENE                             PHYSICIAN:                               
 
REFERRING PHYSICIAN: MARGARETTE LAU MD            
DATE OF SERVICE: 19
Discharge Plan
 
 
Patient Name: CANDACE DESOUZA
Facility: [facility]:[place]
Encounter #: [id number]
Medical Record #: [id number]
: 1945
Planned Disposition: Home
Anticipated Discharge Date: 
 
Discharge Date: 2019
Expected LOS: 
Initial Reviewer: [email or web address]
Initial Review Date: 2019
Generated: 3/14/19   5:04 pm 
Comments
 
DCP- Discharge Planning
 
Updated by [id number]: Olya Montero on 3/14/19   2:53 pm CT
Patient Name:  CANDACE DESOUZA   
Encounter No:  [id number]   
:  1945   
Primary Insurance:  MEDICARE A & B  
Anticipated DC Date:    
Planned Disposition:  Home  
External Planned Provider: :   
D/C IMM EXPLAINED SERVED 3/14/19 @ 1215  
  
DCP follow-up note: Patient and family in agreement with discharge plan. No 
changes to plan. Case management will follow and assist as needed.  
Olya Montero
DCP- Discharge Planning
 
Updated by [email or web address]: Olya Montero on 3/8/19   4:36 pm CT
Patient Name: CANDACE DESOZUA                                     
Admission Status: Urgent   
Accout number: [id number]                              
Admission Date: 2019   
 
: 1945                                                        
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   
Attending: MARGARETTE LAU                                                
Current LOS:  1   
  
Anticipated DC Date:    
Planned Disposition: Home   
Primary Insurance: MEDICARE A & B   
  
  
Discharge Planning Comments: CM met with patient  at bedside after obtaining 
verbal consent.  Patient states he plans on returning home after discharge 
with his son.  Patient states he will have family transport him home via 
private vehicle.  Patient denies any discharge needs at this time. Patient 
may need walk test if still requiring 02. CM will continue to follow and 
assist as needed for discharge planning / needs.  
  
  
  
  
  
  
: Olya Montero
 DCPIA - Discharge Planning Initial Assessment
 
Updated by [email or web address]: Olya Montero on 3/8/19   6:33 pm
*  Is the patient Alert and Oriented?
Yes
*  How many steps to enter\exit or inside your home?
 
*  PCP
[facility]
*  Pharmacy
[facility]
*  Preadmission Environment
Home with Family
*  ADLs
Independent
*  Equipment
Cane
*  Other Equipment
GRAB BARS IN BATH ROOM
*  List name and contact numbers for known caregivers / representatives who 
currently or will assist patient after discharge:
MIAH DESOUZA - BROTHER - [phone]  ORLANDO DESOUZA - SON - [phone]  MIGUEL RICE - FRIEND- [phone]
 
*  Verbal permission to speak to the caregivers and representatives has been 
obtained from the patient.
No
*  Community resources currently utilized
None
 
*  Additional services required to return to the preadmission environment?
No
*  Can the patient safely return to the preadmission environment?
Yes
*  Has this patient been hospitalized within the prior 30 days at any 
hospital?
No
 
 
 
 
 
Coverage Notice
 
Reviewer: [email or web address] - Olya Montero
 
Notice Issued Date-Time: 2019  15:52
Notice Type: IM Discharge Notice
 
Notice Delivered To: Patient
Relationship to Patient: Self
Representative Name: 
 
Delivery Method: HAND - Hand Delivered
Deann Days:
Prior Verbal Notification: 
 
Recipient Understood Notice: Yes
Recipient Signature: Yes
Med Rec Note Co-signed by Attending:
 
Coverage Notice Comment:  
 
Last DP export: 3/14/19   2:55 p
Patient Name: CANDACE DESOUZA
 
Encounter #: [id number]
Page [id number]
 
 
 
 
 
Electronically Signed by GENNY GREENE on 19 at 1604
 
 
 
 
 
 
**All edits/amendments must be made on the electronic document**
 
DICTATION DATE: 19 160     : SANIYA  19 160     
RPT#: [id number]                                DC DATE:19
                                               STATUS: DIS IN  
[facility]
[address] [place] [address]
***END OF REPORT***

## 2019-03-14 NOTE — NUR
DR ADAMS AT THE PTS BEDSIDE. OK TO DC HOME EARLY AFTERNOON. RHEA WHITAKER AT THE
PTS BEDSIDE AND PULLED TPM WIRES PER DR ADAMS. PT LYING IN BED AND INSTRUCTED
TO LAY FLAT FOR 30 MINUTES. PT TOLERATED WELL. PTS SON, JUAN, CALLED AND
MADE AWARE FO DISCHARGE.

## 2019-03-14 NOTE — NUR
REASSESSMENT COMPLETED PER FLOW SHEET, SEE FOR DETAILS. NO ACUTE DISTRESS
NOTED. DENIES NEEDS. CALL LIGHT WITHIN REACH.

## 2019-03-14 NOTE — NUR
PT UP AD ALIA AND AMBULATING AROUND THE UNIT. PT MADE THE "BIG LAP" WITH ME
SUPERVISING. PT DENIES SOB. NORMAL STEADY GAIT. VSS. REMAINED IN NSR. WILL
CONT POC.

## 2019-03-14 NOTE — NUR
PIV DC'D WITH THE CATHETER TIP INTACT. DC INSTRUCTIONS WENT OVER WITH THE PT.
DR SZYMANSKI AND DR TRAN F/U APPOINTMENTS MADE. PT BELONGINGS ACCOUNTED FOR
INCLUDED HOMEMEDS. PT LEFT IN A STABLE CONDTION WITH NO S/SX OF
DISTRESS/DISCOMFORT NOTED. SON DRIVING HIS TRUCK.

## 2019-03-16 ENCOUNTER — HOSPITAL ENCOUNTER (EMERGENCY)
Dept: HOSPITAL 84 - D.ER | Age: 74
Discharge: HOME | End: 2019-03-16
Payer: MEDICARE

## 2019-03-16 VITALS — HEIGHT: 74 IN | BODY MASS INDEX: 28.29 KG/M2 | WEIGHT: 220.46 LBS

## 2019-03-16 VITALS — DIASTOLIC BLOOD PRESSURE: 61 MMHG | SYSTOLIC BLOOD PRESSURE: 99 MMHG

## 2019-03-16 DIAGNOSIS — Z95.1: Primary | ICD-10-CM

## 2019-03-16 DIAGNOSIS — R00.2: ICD-10-CM

## 2019-03-16 DIAGNOSIS — I48.92: ICD-10-CM

## 2019-03-16 DIAGNOSIS — Z86.79: ICD-10-CM

## 2019-03-16 LAB
ALBUMIN SERPL-MCNC: 2.8 G/DL (ref 3.4–5)
ALP SERPL-CCNC: 52 U/L (ref 46–116)
ALT SERPL-CCNC: 21 U/L (ref 10–68)
ANION GAP SERPL CALC-SCNC: 16.2 MMOL/L (ref 8–16)
APTT BLD: 29.7 SECONDS (ref 22.8–39.4)
BASOPHILS NFR BLD AUTO: 0.4 % (ref 0–2)
BILIRUB SERPL-MCNC: 0.8 MG/DL (ref 0.2–1.3)
BUN SERPL-MCNC: 17 MG/DL (ref 7–18)
CALCIUM SERPL-MCNC: 7.9 MG/DL (ref 8.5–10.1)
CHLORIDE SERPL-SCNC: 101 MMOL/L (ref 98–107)
CK MB SERPL-MCNC: 0.5 U/L (ref 0–3.6)
CK SERPL-CCNC: 65 UL (ref 21–232)
CO2 SERPL-SCNC: 21.9 MMOL/L (ref 21–32)
CREAT SERPL-MCNC: 1 MG/DL (ref 0.6–1.3)
EOSINOPHIL NFR BLD: 5.9 % (ref 0–7)
ERYTHROCYTE [DISTWIDTH] IN BLOOD BY AUTOMATED COUNT: 13.1 % (ref 11.5–14.5)
GLOBULIN SER-MCNC: 3.5 G/L
GLUCOSE SERPL-MCNC: 120 MG/DL (ref 74–106)
HCT VFR BLD CALC: 27 % (ref 42–54)
HGB BLD-MCNC: 9 G/DL (ref 13.5–17.5)
IMM GRANULOCYTES NFR BLD: 0.8 % (ref 0–5)
INR PPP: 1.12 (ref 0.85–1.17)
LYMPHOCYTES NFR BLD AUTO: 16.9 % (ref 15–50)
MAGNESIUM SERPL-MCNC: 2 MG/DL (ref 1.8–2.4)
MCH RBC QN AUTO: 32.5 PG (ref 26–34)
MCHC RBC AUTO-ENTMCNC: 33.3 G/DL (ref 31–37)
MCV RBC: 97.5 FL (ref 80–100)
MONOCYTES NFR BLD: 16.4 % (ref 2–11)
NEUTROPHILS NFR BLD AUTO: 59.6 % (ref 40–80)
OSMOLALITY SERPL CALC.SUM OF ELEC: 272 MOSM/KG (ref 275–300)
PLATELET # BLD: 262 10X3/UL (ref 130–400)
PMV BLD AUTO: 9.6 FL (ref 7.4–10.4)
POTASSIUM SERPL-SCNC: 4.1 MMOL/L (ref 3.5–5.1)
PROT SERPL-MCNC: 6.3 G/DL (ref 6.4–8.2)
PROTHROMBIN TIME: 13.9 SECONDS (ref 11.6–15)
RBC # BLD AUTO: 2.77 10X6/UL (ref 4.2–6.1)
SODIUM SERPL-SCNC: 135 MMOL/L (ref 136–145)
TROPONIN I SERPL-MCNC: 0.36 NG/ML (ref 0–0.06)
WBC # BLD AUTO: 8.5 10X3/UL (ref 4.8–10.8)

## 2019-03-18 ENCOUNTER — HOSPITAL ENCOUNTER (OUTPATIENT)
Dept: HOSPITAL 84 - D.CN | Age: 74
Discharge: HOME | End: 2019-03-18
Attending: THORACIC SURGERY (CARDIOTHORACIC VASCULAR SURGERY)
Payer: MEDICARE

## 2019-03-18 VITALS — BODY MASS INDEX: 28.3 KG/M2

## 2019-03-18 DIAGNOSIS — I48.91: Primary | ICD-10-CM

## 2019-04-16 ENCOUNTER — HOSPITAL ENCOUNTER (OUTPATIENT)
Dept: HOSPITAL 84 - D.LAB | Age: 74
Discharge: HOME | End: 2019-04-16
Payer: MEDICARE

## 2019-04-16 DIAGNOSIS — D64.9: Primary | ICD-10-CM

## 2019-04-16 LAB
ANION GAP SERPL CALC-SCNC: 12.6 MMOL/L (ref 8–16)
BUN SERPL-MCNC: 14 MG/DL (ref 7–18)
CALCIUM SERPL-MCNC: 8.5 MG/DL (ref 8.5–10.1)
CHLORIDE SERPL-SCNC: 104 MMOL/L (ref 98–107)
CO2 SERPL-SCNC: 25.7 MMOL/L (ref 21–32)
CREAT SERPL-MCNC: 0.9 MG/DL (ref 0.6–1.3)
ERYTHROCYTE [DISTWIDTH] IN BLOOD BY AUTOMATED COUNT: 14.3 % (ref 11.5–14.5)
GLUCOSE SERPL-MCNC: 89 MG/DL (ref 74–106)
HCT VFR BLD CALC: 32.1 % (ref 42–54)
HGB BLD-MCNC: 10.6 G/DL (ref 13.5–17.5)
MCH RBC QN AUTO: 31.1 PG (ref 26–34)
MCHC RBC AUTO-ENTMCNC: 33 G/DL (ref 31–37)
MCV RBC: 94.1 FL (ref 80–100)
OSMOLALITY SERPL CALC.SUM OF ELEC: 275 MOSM/KG (ref 275–300)
PLATELET # BLD: 181 10X3/UL (ref 130–400)
PMV BLD AUTO: 8.7 FL (ref 7.4–10.4)
POTASSIUM SERPL-SCNC: 4.3 MMOL/L (ref 3.5–5.1)
RBC # BLD AUTO: 3.41 10X6/UL (ref 4.2–6.1)
SODIUM SERPL-SCNC: 138 MMOL/L (ref 136–145)
WBC # BLD AUTO: 5.3 10X3/UL (ref 4.8–10.8)

## 2020-03-25 ENCOUNTER — HOSPITAL ENCOUNTER (OUTPATIENT)
Dept: HOSPITAL 84 - D.CT | Age: 75
Discharge: HOME | End: 2020-03-25
Attending: THORACIC SURGERY (CARDIOTHORACIC VASCULAR SURGERY)
Payer: MEDICARE

## 2020-03-25 VITALS — BODY MASS INDEX: 28.3 KG/M2

## 2020-03-25 DIAGNOSIS — I25.10: Primary | ICD-10-CM

## 2020-03-25 DIAGNOSIS — I71.2: ICD-10-CM

## 2020-08-07 ENCOUNTER — HOSPITAL ENCOUNTER (OUTPATIENT)
Dept: HOSPITAL 84 - D.HCCECHO | Age: 75
Discharge: HOME | End: 2020-08-07
Attending: INTERNAL MEDICINE
Payer: MEDICARE

## 2020-08-07 VITALS — BODY MASS INDEX: 28.3 KG/M2

## 2020-08-07 DIAGNOSIS — I25.10: Primary | ICD-10-CM

## 2021-04-01 ENCOUNTER — HOSPITAL ENCOUNTER (OUTPATIENT)
Dept: HOSPITAL 84 - D.CT | Age: 76
Discharge: HOME | End: 2021-04-01
Attending: THORACIC SURGERY (CARDIOTHORACIC VASCULAR SURGERY)
Payer: MEDICARE

## 2021-04-01 VITALS — BODY MASS INDEX: 28.3 KG/M2

## 2021-04-01 DIAGNOSIS — I25.10: Primary | ICD-10-CM
